# Patient Record
Sex: FEMALE | Race: WHITE | NOT HISPANIC OR LATINO | Employment: OTHER | URBAN - METROPOLITAN AREA
[De-identification: names, ages, dates, MRNs, and addresses within clinical notes are randomized per-mention and may not be internally consistent; named-entity substitution may affect disease eponyms.]

---

## 2020-01-25 ENCOUNTER — HOSPITAL ENCOUNTER (EMERGENCY)
Facility: HOSPITAL | Age: 70
Discharge: HOME | End: 2020-01-25
Attending: INTERNAL MEDICINE
Payer: MEDICARE

## 2020-01-25 VITALS
WEIGHT: 174.69 LBS | DIASTOLIC BLOOD PRESSURE: 77 MMHG | OXYGEN SATURATION: 98 % | BODY MASS INDEX: 28.08 KG/M2 | RESPIRATION RATE: 16 BRPM | TEMPERATURE: 98 F | SYSTOLIC BLOOD PRESSURE: 176 MMHG | HEIGHT: 66 IN | HEART RATE: 61 BPM
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
## 2020-01-25 DIAGNOSIS — S00.03XA CONTUSION OF SCALP, INITIAL ENCOUNTER: ICD-10-CM

## 2020-01-25 DIAGNOSIS — I10 ESSENTIAL HYPERTENSION: ICD-10-CM

## 2020-01-25 PROCEDURE — 99282 EMERGENCY DEPT VISIT SF MDM: CPT

## 2020-01-25 NOTE — DISCHARGE INSTRUCTIONS
Check blood pressure at home and report to primary care physician if his stays high.  Come back to the emergency room if you have any worsening headache lightheadedness loss of vision  Take fall precautions

## 2020-01-25 NOTE — ED PROVIDER NOTES
"SCRIBE #1 NOTE: I, Sarah Beth Owen, am scribing for, and in the presence of, Nakia Soto MD. I have scribed the entire note.       History     Chief Complaint   Patient presents with    Fall     pt c/o trip and fall this morning, pt reports striking head/face on ground, denies LOC, no blood thinners; pt c/o bump to R forehead and HA     Review of patient's allergies indicates:   Allergen Reactions    Keflex [cephalexin] Anaphylaxis and Swelling     "Throat closing"    Atenolol Other (See Comments)     "Cough and wheeze"    Augmentin [amoxicillin-pot clavulanate] Hives and Itching     "Itchy hives after 4 days"    Beta-blockers (beta-adrenergic blocking agts)     Betadine [povidone-iodine] Other (See Comments)     "Contact dermatitis"    Carbacephem     Cephalosporins     Clindamycin Other (See Comments)     "Severe heartburn"    Demerol [meperidine] Itching and Other (See Comments)     "Erythema and itching at injection site"    Guaifenesin Other (See Comments)     "Stomach cramps"    Hyoscamine Diarrhea    Levsin [hyoscyamine sulfate]     Lidocaine Other (See Comments)     "Rapid heart beat"    Macrodantin [nitrofurantoin macrocrystal]     Penicillins     Sulfa (sulfonamide antibiotics)     Thimerosal Other (See Comments)     "Positive patch test by history"    Urimar-t [lkwhto-lttbb-h.blue-sal-naphos]     Volmax [albuterol sulfate] Other (See Comments)     "Headache and shaky"    Ciprofloxacin Itching and Rash    Influenza virus vaccines Palpitations     "Fast heart rate"    Limbitrol [amitriptyline-chlordiazepoxide] Rash    Vibramycin [doxycycline hyclate] Rash    Zithromax [azithromycin] Itching and Rash         History of Present Illness     HPI    1/25/2020, 9:15 AM  History obtained from the patient      History of Present Illness: Christine Panda is a 69 y.o. female patient who presents to the Emergency Department for evaluation of fall which onset suddenly at 7:30 AM today. " Pt reports she tripped and fell this morning. She states her head/face struck the ground. Pt c/o abrasion to mid forehead in between eyebrows, nosebleed (resolved), and frontal HA. Symptoms are constant and moderate in severity. No mitigating or exacerbating factors reported. Patient denies any LOC, fever, chills, numbness, weakness, back pain, neck pain, abd pain, CP, SOB, n/v/d, and all other sxs at this time. No further complaints or concerns at this time.     Arrival mode: Personal vehicle      PCP: Marcelo Ferreira     Past Medical History:  Past medical history reviewed not relevant      Past Surgical History:  Past surgical history reviewed not relevant      Family History:  Family history reviewed not relevant      Social History:  Social History    Social History Main Topics    Social History Main Topics    Smoking status: Unknown if ever smoked    Smokeless tobacco: Unknown if ever used    Alcohol Use: Unknown drinking history    Drug Use: Unknown if ever used    Sexual Activity: Unknown        Review of Systems     Review of Systems   Constitutional: Negative for chills and fever.        (+) fall   (+) head injury/trauma   HENT: Positive for nosebleeds (resolved). Negative for sore throat.         (+) abrasion to mid forehead in between eyebrows   Respiratory: Negative for shortness of breath.    Cardiovascular: Negative for chest pain.   Gastrointestinal: Negative for abdominal pain, diarrhea, nausea and vomiting.   Genitourinary: Negative for dysuria.   Musculoskeletal: Negative for back pain and neck pain.   Skin: Negative for rash.   Neurological: Positive for headaches (frontal). Negative for weakness and numbness.        (-) LOC   Hematological: Does not bruise/bleed easily.   All other systems reviewed and are negative.     Physical Exam     Initial Vitals [01/25/20 0847]   BP Pulse Resp Temp SpO2   (!) 204/92 60 20 97.4 °F (36.3 °C) 100 %      MAP       --          Physical Exam  Nursing Notes  "and Vital Signs Reviewed.  Constitutional: Patient is in no acute distress. Well-developed and well-nourished.  Head: Abrasion to mid forehead in between eyebrows.  Normocephalic.  Eyes: PERRL. EOM intact. Conjunctivae are not pale. No scleral icterus.  Ears: Right TM normal. Left TM normal. No erythema. No bulging. No effusion or air-fluid levels. No perforation. No CSF leak.   Nose: Patent nares. Turbinates are normal. No drainage.   Throat: Moist mucous membranes. Posterior oropharynx is symmetric without erythema. Tonsillar exudate is not present. No trismus. Normal handling of secretions. No stridor.  Neck: Supple. Full ROM. No lymphadenopathy.  Cardiovascular: Regular rate. Regular rhythm. No murmurs, rubs, or gallops. Distal pulses are 2+ and symmetric.  Pulmonary/Chest: No respiratory distress. Clear to auscultation bilaterally. No wheezing or rales.  Abdominal: Soft and non-distended.  There is no tenderness.  No rebound, guarding, or rigidity. Good bowel sounds.  Genitourinary: No CVA tenderness  Musculoskeletal: Moves all extremities. No obvious deformities. No edema. No calf tenderness.  Skin: Warm and dry.  Neurological: Patient is alert and oriented to person, place and time. Pupils ERRL and EOM normal. Cranial nerves II-XII are intact. Strength is full bilaterally; it is equal and 5/5 in bilateral upper and lower extremities. There is no pronator drift of outstretched arms. Light touch sense is intact. Speech is clear and normal. No acute focal neurological deficits noted.  Psychiatric: Normal affect. Good eye contact. Appropriate in content.     ED Course   Procedures  ED Vital Signs:  Vitals:    01/25/20 0847 01/25/20 0918 01/25/20 0950 01/25/20 1000   BP: (!) 204/92 (!) 204/87 (!) 176/77    Pulse: 60   61   Resp: 20   16   Temp: 97.4 °F (36.3 °C)   97.7 °F (36.5 °C)   TempSrc: Oral   Oral   SpO2: 100%   98%   Weight: 79.2 kg (174 lb 11.4 oz)      Height: 5' 6" (1.676 m)          Abnormal Lab " Results:  Labs Reviewed - No data to display     Imaging Results:  Imaging Results    None                 The Emergency Provider reviewed the vital signs and test results, which are outlined above.     ED Discussion     10:16 AM: Reassessed pt at this time.  Pt's re-check BP was 176/77. It has come down 40-50 points at this time and I believe it will go down further. Discussed with pt all pertinent ED information and results. Discussed pt dx and plan of tx. Gave pt all f/u and return to the ED instructions. All questions and concerns were addressed at this time. Pt expresses understanding of information and instructions, and is comfortable with plan to discharge. Pt is stable for discharge.    I discussed with patient and/or family/caretaker that evaluation in the ED does not suggest any emergent or life threatening medical conditions requiring immediate intervention beyond what was provided in the ED, and I believe patient is safe for discharge.  Regardless, an unremarkable evaluation in the ED does not preclude the development or presence of a serious of life threatening condition. As such, patient was instructed to return immediately for any worsening or change in current symptoms.                   ED Medication(s):  Medications - No data to display    There are no discharge medications for this patient.      Follow-up Information     Go to  [facility].    Specialty:  Emergency Medicine  Why:  If symptoms worsen  Contact information:  [address] [facility] [address] [phone]                     Scribe Attestation:   Scribe #1: I performed the above scribed service and the documentation accurately describes the services I performed. I attest to the accuracy of the note.     Attending:   Physician Attestation Statement for Scribe #1: I, Nakia Soto MD, personally performed the services described in this documentation, as scribed by Sarah Beth Owen, in my  presence, and it is both accurate and complete.           Clinical Impression       ICD-10-CM ICD-9-CM   1. Fall, initial encounter W19.XXXA E888.9   2. Contusion of scalp, initial encounter S00.03XA 920   3. Essential hypertension I10 401.9     Patient not on any blood thinner.  No neurological deficit. Blood pressure much improved after observation of-hour in the emergency room.  Patient able to walk without any difficulty.  No neurovascular abnormality detected.  Thorough examination multiple visits carried out and no further workup our imaging was needed/necessary.  Patient is stable for discharge.      Disposition:   Disposition: Discharged  Condition: Stable       Nakia Soto MD  01/25/20 2883

## 2022-10-18 ENCOUNTER — HOSPITAL ENCOUNTER (EMERGENCY)
Facility: HOSPITAL | Age: 72
Discharge: HOME | End: 2022-10-18
Attending: EMERGENCY MEDICINE
Payer: MEDICARE

## 2022-10-18 VITALS
DIASTOLIC BLOOD PRESSURE: 74 MMHG | TEMPERATURE: 98 F | BODY MASS INDEX: 27.03 KG/M2 | HEART RATE: 54 BPM | OXYGEN SATURATION: 98 % | RESPIRATION RATE: 22 BRPM | WEIGHT: 167.44 LBS | SYSTOLIC BLOOD PRESSURE: 145 MMHG

## 2022-10-18 DIAGNOSIS — R42 LIGHTHEADEDNESS: ICD-10-CM

## 2022-10-18 DIAGNOSIS — I48.20 CHRONIC ATRIAL FIBRILLATION: ICD-10-CM

## 2022-10-18 DIAGNOSIS — R53.1 WEAKNESS: Primary | ICD-10-CM

## 2022-10-18 LAB
ALBUMIN SERPL BCP-MCNC: 4.3 G/DL (ref 3.5–5.2)
ALP SERPL-CCNC: 106 U/L (ref 55–135)
ALT SERPL W/O P-5'-P-CCNC: 22 U/L (ref 10–44)
ANION GAP SERPL CALC-SCNC: 12 MMOL/L (ref 8–16)
AST SERPL-CCNC: 23 U/L (ref 10–40)
BACTERIA #/AREA URNS HPF: NORMAL /HPF
BASOPHILS # BLD AUTO: 0.04 K/UL (ref 0–0.2)
BASOPHILS NFR BLD: 0.7 % (ref 0–1.9)
BILIRUB SERPL-MCNC: 1.2 MG/DL (ref 0.1–1)
BILIRUB UR QL STRIP: NEGATIVE
BNP SERPL-MCNC: 176 PG/ML (ref 0–99)
BUN SERPL-MCNC: 12 MG/DL (ref 8–23)
CALCIUM SERPL-MCNC: 10.6 MG/DL (ref 8.7–10.5)
CHLORIDE SERPL-SCNC: 108 MMOL/L (ref 95–110)
CLARITY UR: CLEAR
CO2 SERPL-SCNC: 23 MMOL/L (ref 23–29)
COLOR UR: COLORLESS
CREAT SERPL-MCNC: 0.8 MG/DL (ref 0.5–1.4)
DIFFERENTIAL METHOD: NORMAL
EOSINOPHIL # BLD AUTO: 0.1 K/UL (ref 0–0.5)
EOSINOPHIL NFR BLD: 2 % (ref 0–8)
ERYTHROCYTE [DISTWIDTH] IN BLOOD BY AUTOMATED COUNT: 13.2 % (ref 11.5–14.5)
EST. GFR  (NO RACE VARIABLE): >60 ML/MIN/1.73 M^2
GLUCOSE SERPL-MCNC: 95 MG/DL (ref 70–110)
GLUCOSE UR QL STRIP: NEGATIVE
HCT VFR BLD AUTO: 45 % (ref 37–48.5)
HCV AB SERPL QL IA: NEGATIVE
HEP C VIRUS HOLD SPECIMEN: NORMAL
HGB BLD-MCNC: 15 G/DL (ref 12–16)
HGB UR QL STRIP: NEGATIVE
HIV 1+2 AB+HIV1 P24 AG SERPL QL IA: NEGATIVE
IMM GRANULOCYTES # BLD AUTO: 0.03 K/UL (ref 0–0.04)
IMM GRANULOCYTES NFR BLD AUTO: 0.5 % (ref 0–0.5)
KETONES UR QL STRIP: NEGATIVE
LEUKOCYTE ESTERASE UR QL STRIP: ABNORMAL
LYMPHOCYTES # BLD AUTO: 2.4 K/UL (ref 1–4.8)
LYMPHOCYTES NFR BLD: 40.1 % (ref 18–48)
MCH RBC QN AUTO: 28.6 PG (ref 27–31)
MCHC RBC AUTO-ENTMCNC: 33.3 G/DL (ref 32–36)
MCV RBC AUTO: 86 FL (ref 82–98)
MICROSCOPIC COMMENT: NORMAL
MONOCYTES # BLD AUTO: 0.7 K/UL (ref 0.3–1)
MONOCYTES NFR BLD: 11.2 % (ref 4–15)
NEUTROPHILS # BLD AUTO: 2.8 K/UL (ref 1.8–7.7)
NEUTROPHILS NFR BLD: 45.5 % (ref 38–73)
NITRITE UR QL STRIP: NEGATIVE
NRBC BLD-RTO: 0 /100 WBC
PH UR STRIP: 8 [PH] (ref 5–8)
PLATELET # BLD AUTO: 252 K/UL (ref 150–450)
PMV BLD AUTO: 11.4 FL (ref 9.2–12.9)
POTASSIUM SERPL-SCNC: 4.1 MMOL/L (ref 3.5–5.1)
PROT SERPL-MCNC: 8.2 G/DL (ref 6–8.4)
PROT UR QL STRIP: NEGATIVE
RBC # BLD AUTO: 5.25 M/UL (ref 4–5.4)
SODIUM SERPL-SCNC: 143 MMOL/L (ref 136–145)
SP GR UR STRIP: <1.005 (ref 1–1.03)
SQUAMOUS #/AREA URNS HPF: 5 /HPF
TROPONIN I SERPL DL<=0.01 NG/ML-MCNC: <0.006 NG/ML (ref 0–0.03)
TROPONIN I SERPL DL<=0.01 NG/ML-MCNC: <0.006 NG/ML (ref 0–0.03)
URN SPEC COLLECT METH UR: ABNORMAL
UROBILINOGEN UR STRIP-ACNC: NEGATIVE EU/DL
WBC # BLD AUTO: 6.08 K/UL (ref 3.9–12.7)
WBC #/AREA URNS HPF: 3 /HPF (ref 0–5)

## 2022-10-18 PROCEDURE — 80053 COMPREHEN METABOLIC PANEL: CPT | Performed by: PHYSICIAN ASSISTANT

## 2022-10-18 PROCEDURE — 84484 ASSAY OF TROPONIN QUANT: CPT | Performed by: PHYSICIAN ASSISTANT

## 2022-10-18 PROCEDURE — 25000003 PHARM REV CODE 250: Performed by: EMERGENCY MEDICINE

## 2022-10-18 PROCEDURE — 93005 ELECTROCARDIOGRAM TRACING: CPT

## 2022-10-18 PROCEDURE — 86803 HEPATITIS C AB TEST: CPT | Performed by: EMERGENCY MEDICINE

## 2022-10-18 PROCEDURE — 83880 ASSAY OF NATRIURETIC PEPTIDE: CPT | Performed by: PHYSICIAN ASSISTANT

## 2022-10-18 PROCEDURE — 96374 THER/PROPH/DIAG INJ IV PUSH: CPT

## 2022-10-18 PROCEDURE — 93010 ELECTROCARDIOGRAM REPORT: CPT | Mod: ,,, | Performed by: INTERNAL MEDICINE

## 2022-10-18 PROCEDURE — 87389 HIV-1 AG W/HIV-1&-2 AB AG IA: CPT | Performed by: EMERGENCY MEDICINE

## 2022-10-18 PROCEDURE — 99285 EMERGENCY DEPT VISIT HI MDM: CPT | Mod: 25

## 2022-10-18 PROCEDURE — 81000 URINALYSIS NONAUTO W/SCOPE: CPT | Performed by: EMERGENCY MEDICINE

## 2022-10-18 PROCEDURE — 93010 EKG 12-LEAD: ICD-10-PCS | Mod: ,,, | Performed by: INTERNAL MEDICINE

## 2022-10-18 PROCEDURE — 85025 COMPLETE CBC W/AUTO DIFF WBC: CPT | Performed by: PHYSICIAN ASSISTANT

## 2022-10-18 RX ORDER — METOPROLOL TARTRATE 1 MG/ML
5 INJECTION, SOLUTION INTRAVENOUS
Status: COMPLETED | OUTPATIENT
Start: 2022-10-18 | End: 2022-10-18

## 2022-10-18 RX ADMIN — METOROPROLOL TARTRATE 5 MG: 5 INJECTION, SOLUTION INTRAVENOUS at 01:10

## 2022-10-18 NOTE — FIRST PROVIDER EVALUATION
Medical screening examination initiated.  I have conducted a focused provider triage encounter, findings are as follows:    Brief history of present illness:  Feeling near syncopal for last 2 hours. Hx afib    There were no vitals filed for this visit.    Pertinent physical exam:  alert        Preliminary workup initiated; this workup will be continued and followed by the physician or advanced practice provider that is assigned to the patient when roomed.

## 2022-10-18 NOTE — ED PROVIDER NOTES
"SCRIBE #1 NOTE: I, Agnieszka Rodriguez, am scribing for, and in the presence of, Merle Kothari MD. I have scribed the entire note.      History      Chief Complaint   Patient presents with    Weakness     Generalized weakness x2 hours, states she feels like she is about to pass out. Hx afib. Denies chest pain/SOB       Review of patient's allergies indicates:   Allergen Reactions    Keflex [cephalexin] Anaphylaxis and Swelling     "Throat closing"    Atenolol Other (See Comments)     "Cough and wheeze"    Augmentin [amoxicillin-pot clavulanate] Hives and Itching     "Itchy hives after 4 days"    Beta-blockers (beta-adrenergic blocking agts)     Betadine [povidone-iodine] Other (See Comments)     "Contact dermatitis"    Carbacephem     Cephalosporins     Chlorpheniram-pe-chlophedianol     Clindamycin Other (See Comments)     "Severe heartburn"    Demerol [meperidine] Itching and Other (See Comments)     "Erythema and itching at injection site"    Guaifenesin Other (See Comments)     "Stomach cramps"    Hyoscamine Diarrhea    Levsin [hyoscyamine sulfate]     Lidocaine Other (See Comments)     "Rapid heart beat"    Macrodantin [nitrofurantoin macrocrystal]     Penicillins     Sulfa (sulfonamide antibiotics)     Thimerosal Other (See Comments)     "Positive patch test by history"    Urimar-t [tcxijp-yvmyf-u.blue-sal-naphos]     Vancomycin     Volmax [albuterol sulfate] Other (See Comments)     "Headache and shaky"    Ciprofloxacin Itching and Rash    Influenza virus vaccines Palpitations     "Fast heart rate"    Limbitrol [amitriptyline-chlordiazepoxide] Rash    Vibramycin [doxycycline hyclate] Rash    Zithromax [azithromycin] Itching and Rash        HPI   HPI    10/18/2022, 1:23 PM   History obtained from the patient      History of Present Illness: Christine Panda is a 72 y.o. female patient with a PMHx of asthma and atrial fibrillation who presents to the Emergency Department for light-headedness which onset " suddenly 2 hours PTA. The pt reports that she regularly gets episodes of light-headedness, but she usually only gets 1 or 2 episodes and her sxs do not normally last as long as they are lasting today. Pt reports that she feels better now. Symptoms are episodic and moderate in severity. No mitigating or exacerbating factors reported. Associated sxs include generalized weakness and near-syncope. Patient denies any CP, SOB, palpitations, dizziness, fever, chills, numbness, and all other sxs at this time. No prior Tx reported. Pt states that she takes 5 mg Eliquis BID and 25 mg Toprol BID for A-fib and took them both today. Pt reports that her Cardiologist is Dr. Moeller. No further complaints or concerns at this time.         Arrival mode: Personal vehicle     PCP: Marcelo Ferreira       Past Medical History:  Past Medical History:   Diagnosis Date    Asthma     Atrial fibrillation        Past Surgical History:  Past Surgical History:   Procedure Laterality Date    APPENDECTOMY      BILATERAL SALPINGO-OOPHORECTOMY (BSO)      BLADDER SURGERY      ELBOW SURGERY      LAVH       TONSILLECTOMY           Family History:  Family History   Problem Relation Age of Onset    Colon cancer Mother     Breast cancer Maternal Aunt     Uterine cancer Maternal Aunt     Diabetes type II Maternal Aunt     Hypertension Maternal Grandmother     Hypertension Maternal Grandfather     Hypertension Paternal Grandmother     Hypertension Paternal Grandfather        Social History:  Social History     Tobacco Use    Smoking status: Never    Smokeless tobacco: Never   Substance and Sexual Activity    Alcohol use: Never    Drug use: Never    Sexual activity: Not Currently     Partners: Male       ROS   Review of Systems   Constitutional:  Negative for chills and fever.   HENT:  Negative for sore throat.    Respiratory:  Negative for shortness of breath.    Cardiovascular:  Negative for chest pain and palpitations.   Gastrointestinal:  Negative for  nausea.   Genitourinary:  Negative for dysuria.   Musculoskeletal:  Negative for back pain.   Skin:  Negative for rash.   Neurological:  Positive for weakness (generalized) and light-headedness. Negative for dizziness and numbness.        (+) near-syncope   Hematological:  Does not bruise/bleed easily.   All other systems reviewed and are negative.    Physical Exam      Initial Vitals   BP Pulse Resp Temp SpO2   10/18/22 1126 10/18/22 1125 10/18/22 1125 10/18/22 1125 10/18/22 1125   (!) 175/105 100 19 97.5 °F (36.4 °C) 99 %      MAP       --                 Physical Exam  Nursing Notes and Vital Signs Reviewed.  Constitutional: Patient is in no acute distress. Well-developed and well-nourished.  Head: Atraumatic. Normocephalic.  Eyes: PERRL. EOM intact. Conjunctivae are not pale. No scleral icterus.  ENT: Mucous membranes are moist. Oropharynx is clear and symmetric.    Neck: Supple. Full ROM. No lymphadenopathy.  Cardiovascular: Regular rate. Regular rhythm. No murmurs, rubs, or gallops. Distal pulses are 2+ and symmetric.  Pulmonary/Chest: No respiratory distress. Clear to auscultation bilaterally. No wheezing or rales.  Abdominal: Soft and non-distended.  There is no tenderness.  No rebound, guarding, or rigidity.  Musculoskeletal: Moves all extremities. No obvious deformities. No edema.  Skin: Warm and dry.  Neurological:  Alert, awake, and appropriate.  Normal speech.  No acute focal neurological deficits are appreciated.  Psychiatric: Normal affect. Good eye contact. Appropriate in content.    ED Course    Procedures  ED Vital Signs:  Vitals:    10/18/22 1125 10/18/22 1126 10/18/22 1127 10/18/22 1306   BP:  (!) 175/105     Pulse: 100   99   Resp: 19      Temp: 97.5 °F (36.4 °C)      TempSrc: Oral      SpO2: 99%      Weight:   76 kg (167 lb 7 oz)     10/18/22 1330 10/18/22 1336 10/18/22 1415 10/18/22 1500   BP: (!) 173/113 (!) 173/113  (!) 145/74   Pulse: 83  (!) 54 (!) 54   Resp: 18  (!) 33 (!) 22   Temp:        TempSrc:       SpO2: 100%  96% 98%   Weight:           Abnormal Lab Results:  Labs Reviewed   COMPREHENSIVE METABOLIC PANEL - Abnormal; Notable for the following components:       Result Value    Calcium 10.6 (*)     Total Bilirubin 1.2 (*)     All other components within normal limits   B-TYPE NATRIURETIC PEPTIDE - Abnormal; Notable for the following components:     (*)     All other components within normal limits   URINALYSIS, REFLEX TO URINE CULTURE - Abnormal; Notable for the following components:    Color, UA Colorless (*)     Specific Gravity, UA <1.005 (*)     Leukocytes, UA 1+ (*)     All other components within normal limits    Narrative:     Specimen Source->Urine   CBC W/ AUTO DIFFERENTIAL   TROPONIN I   TROPONIN I   HIV 1 / 2 ANTIBODY    Narrative:     Release to patient->Immediate   HEPATITIS C ANTIBODY    Narrative:     Release to patient->Immediate   HEP C VIRUS HOLD SPECIMEN    Narrative:     Release to patient->Immediate   URINALYSIS MICROSCOPIC    Narrative:     Specimen Source->Urine        All Lab Results:  Results for orders placed or performed during the hospital encounter of 10/18/22   CBC auto differential   Result Value Ref Range    WBC 6.08 3.90 - 12.70 K/uL    RBC 5.25 4.00 - 5.40 M/uL    Hemoglobin 15.0 12.0 - 16.0 g/dL    Hematocrit 45.0 37.0 - 48.5 %    MCV 86 82 - 98 fL    MCH 28.6 27.0 - 31.0 pg    MCHC 33.3 32.0 - 36.0 g/dL    RDW 13.2 11.5 - 14.5 %    Platelets 252 150 - 450 K/uL    MPV 11.4 9.2 - 12.9 fL    Immature Granulocytes 0.5 0.0 - 0.5 %    Gran # (ANC) 2.8 1.8 - 7.7 K/uL    Immature Grans (Abs) 0.03 0.00 - 0.04 K/uL    Lymph # 2.4 1.0 - 4.8 K/uL    Mono # 0.7 0.3 - 1.0 K/uL    Eos # 0.1 0.0 - 0.5 K/uL    Baso # 0.04 0.00 - 0.20 K/uL    nRBC 0 0 /100 WBC    Gran % 45.5 38.0 - 73.0 %    Lymph % 40.1 18.0 - 48.0 %    Mono % 11.2 4.0 - 15.0 %    Eosinophil % 2.0 0.0 - 8.0 %    Basophil % 0.7 0.0 - 1.9 %    Differential Method Automated    Comprehensive metabolic panel    Result Value Ref Range    Sodium 143 136 - 145 mmol/L    Potassium 4.1 3.5 - 5.1 mmol/L    Chloride 108 95 - 110 mmol/L    CO2 23 23 - 29 mmol/L    Glucose 95 70 - 110 mg/dL    BUN 12 8 - 23 mg/dL    Creatinine 0.8 0.5 - 1.4 mg/dL    Calcium 10.6 (H) 8.7 - 10.5 mg/dL    Total Protein 8.2 6.0 - 8.4 g/dL    Albumin 4.3 3.5 - 5.2 g/dL    Total Bilirubin 1.2 (H) 0.1 - 1.0 mg/dL    Alkaline Phosphatase 106 55 - 135 U/L    AST 23 10 - 40 U/L    ALT 22 10 - 44 U/L    Anion Gap 12 8 - 16 mmol/L    eGFR >60 >60 mL/min/1.73 m^2   Troponin I #1   Result Value Ref Range    Troponin I <0.006 0.000 - 0.026 ng/mL   Troponin I #2   Result Value Ref Range    Troponin I <0.006 0.000 - 0.026 ng/mL   BNP   Result Value Ref Range     (H) 0 - 99 pg/mL   HIV 1/2 Ag/Ab (4th Gen)   Result Value Ref Range    HIV 1/2 Ag/Ab Negative Negative   Hepatitis C Antibody   Result Value Ref Range    Hepatitis C Ab Negative Negative   HCV Virus Hold Specimen   Result Value Ref Range    HEP C Virus Hold Specimen Hold for HCV sendout    Urinalysis, Reflex to Urine Culture Urine, Clean Catch    Specimen: Urine   Result Value Ref Range    Specimen UA Urine, Clean Catch     Color, UA Colorless (A) Yellow, Straw, Lesli    Appearance, UA Clear Clear    pH, UA 8.0 5.0 - 8.0    Specific Gravity, UA <1.005 (A) 1.005 - 1.030    Protein, UA Negative Negative    Glucose, UA Negative Negative    Ketones, UA Negative Negative    Bilirubin (UA) Negative Negative    Occult Blood UA Negative Negative    Nitrite, UA Negative Negative    Urobilinogen, UA Negative <2.0 EU/dL    Leukocytes, UA 1+ (A) Negative   Urinalysis Microscopic   Result Value Ref Range    WBC, UA 3 0 - 5 /hpf    Bacteria Rare None-Occ /hpf    Squam Epithel, UA 5 /hpf    Microscopic Comment SEE COMMENT            Imaging Results:  Imaging Results              X-Ray Chest AP Portable (Final result)  Result time 10/18/22 14:39:18      Final result by Galo Adkins MD (10/18/22 14:39:18)                    Impression:      No acute abnormality.      Electronically signed by: Galo Adkins  Date:    10/18/2022  Time:    14:39               Narrative:    EXAMINATION:  XR CHEST AP PORTABLE    CLINICAL HISTORY:  Weakness    TECHNIQUE:  Single frontal view of the chest was performed.    COMPARISON:  None    FINDINGS:  The lungs are clear, with normal appearance of pulmonary vasculature and no pleural effusion or pneumothorax.    The cardiac silhouette is normal in size. The hilar and mediastinal contours are unremarkable.    Bones are intact.                                     The EKG was ordered, reviewed, and independently interpreted by the ED provider.  Interpretation time: 11:24  Rate: 101 BPM  Rhythm: atrial flutter with variable A-V block  Interpretation: Nonspecific ST abnormality. No STEMI.           The Emergency Provider reviewed the vital signs and test results, which are outlined above.    ED Discussion     3:10 PM: Reassessed pt at this time. Discussed with pt all pertinent ED information and results. Discussed pt dx and plan of tx. Gave pt all f/u and return to the ED instructions. All questions and concerns were addressed at this time. Pt expresses understanding of information and instructions, and is comfortable with plan to discharge. Pt is stable for discharge.    I discussed with patient and/or family/caretaker that evaluation in the ED does not suggest any emergent or life threatening medical conditions requiring immediate intervention beyond what was provided in the ED, and I believe patient is safe for discharge.  Regardless, an unremarkable evaluation in the ED does not preclude the development or presence of a serious of life threatening condition. As such, patient was instructed to return immediately for any worsening or change in current symptoms.             ED Medication(s):  Medications   metoprolol injection 5 mg (5 mg Intravenous Given 10/18/22 1336)       Discharge Medication  List as of 10/18/2022  3:15 PM           Follow-up Information       Morales Moeller MD. Schedule an appointment as soon as possible for a visit in 2 days.    Specialty: Cardiology  Why: Return to the Emergency Room, If symptoms worsen  Contact information:  [address] [facility]  [address]  [facility] [address]  [phone]                               Medical Decision Making    Medical Decision Making:   Clinical Tests:   Lab Tests: Ordered and Reviewed  Radiological Study: Ordered and Reviewed  Medical Tests: Ordered and Reviewed         Scribe Attestation:   Scribe #1: I performed the above scribed service and the documentation accurately describes the services I performed. I attest to the accuracy of the note.    Attending:   Physician Attestation Statement for Scribe #1: I, Merle Kothari MD, personally performed the services described in this documentation, as scribed by Agnieszka Rodriguez, in my presence, and it is both accurate and complete.          Clinical Impression       ICD-10-CM ICD-9-CM   1. Weakness  R53.1 780.79   2. Lightheadedness  R42 780.4   3. Chronic atrial fibrillation  I48.20 427.31       Disposition:   Disposition: Discharged  Condition: Stable       Merle Kothari MD  10/18/22 7600

## 2022-11-11 ENCOUNTER — HOSPITAL ENCOUNTER (OUTPATIENT)
Facility: HOSPITAL | Age: 72
Discharge: HOME | End: 2022-11-12
Attending: EMERGENCY MEDICINE | Admitting: STUDENT IN AN ORGANIZED HEALTH CARE EDUCATION/TRAINING PROGRAM
Payer: MEDICARE

## 2022-11-11 DIAGNOSIS — R55 NEAR SYNCOPE: ICD-10-CM

## 2022-11-11 DIAGNOSIS — I48.0 PAF (PAROXYSMAL ATRIAL FIBRILLATION): ICD-10-CM

## 2022-11-11 DIAGNOSIS — I48.91 ATRIAL FIBRILLATION: Primary | ICD-10-CM

## 2022-11-11 DIAGNOSIS — I10 CHRONIC HYPERTENSION: ICD-10-CM

## 2022-11-11 DIAGNOSIS — F41.9 ANXIETY: ICD-10-CM

## 2022-11-11 DIAGNOSIS — R07.9 CHEST PAIN: ICD-10-CM

## 2022-11-11 DIAGNOSIS — Z79.01 CHRONIC ANTICOAGULATION: ICD-10-CM

## 2022-11-11 LAB
ALBUMIN SERPL BCP-MCNC: 4.2 G/DL (ref 3.5–5.2)
ALP SERPL-CCNC: 103 U/L (ref 55–135)
ALT SERPL W/O P-5'-P-CCNC: 17 U/L (ref 10–44)
ANION GAP SERPL CALC-SCNC: 14 MMOL/L (ref 8–16)
APTT BLDCRRT: 34.7 SEC (ref 21–32)
AST SERPL-CCNC: 17 U/L (ref 10–40)
BASOPHILS # BLD AUTO: 0.03 K/UL (ref 0–0.2)
BASOPHILS NFR BLD: 0.5 % (ref 0–1.9)
BILIRUB SERPL-MCNC: 0.9 MG/DL (ref 0.1–1)
BUN SERPL-MCNC: 10 MG/DL (ref 8–23)
CALCIUM SERPL-MCNC: 10.3 MG/DL (ref 8.7–10.5)
CHLORIDE SERPL-SCNC: 107 MMOL/L (ref 95–110)
CK SERPL-CCNC: 52 U/L (ref 20–180)
CO2 SERPL-SCNC: 20 MMOL/L (ref 23–29)
CREAT SERPL-MCNC: 0.8 MG/DL (ref 0.5–1.4)
DIFFERENTIAL METHOD: ABNORMAL
EOSINOPHIL # BLD AUTO: 0.1 K/UL (ref 0–0.5)
EOSINOPHIL NFR BLD: 1.7 % (ref 0–8)
ERYTHROCYTE [DISTWIDTH] IN BLOOD BY AUTOMATED COUNT: 13.2 % (ref 11.5–14.5)
EST. GFR  (NO RACE VARIABLE): >60 ML/MIN/1.73 M^2
GLUCOSE SERPL-MCNC: 97 MG/DL (ref 70–110)
HCT VFR BLD AUTO: 46.9 % (ref 37–48.5)
HGB BLD-MCNC: 15.5 G/DL (ref 12–16)
IMM GRANULOCYTES # BLD AUTO: 0 K/UL (ref 0–0.04)
IMM GRANULOCYTES NFR BLD AUTO: 0 % (ref 0–0.5)
INR PPP: 1.1 (ref 0.8–1.2)
LYMPHOCYTES # BLD AUTO: 3.1 K/UL (ref 1–4.8)
LYMPHOCYTES NFR BLD: 48.5 % (ref 18–48)
MAGNESIUM SERPL-MCNC: 2 MG/DL (ref 1.6–2.6)
MCH RBC QN AUTO: 28.9 PG (ref 27–31)
MCHC RBC AUTO-ENTMCNC: 33 G/DL (ref 32–36)
MCV RBC AUTO: 87 FL (ref 82–98)
MONOCYTES # BLD AUTO: 0.8 K/UL (ref 0.3–1)
MONOCYTES NFR BLD: 11.6 % (ref 4–15)
NEUTROPHILS # BLD AUTO: 2.4 K/UL (ref 1.8–7.7)
NEUTROPHILS NFR BLD: 37.7 % (ref 38–73)
NRBC BLD-RTO: 0 /100 WBC
PLATELET # BLD AUTO: 224 K/UL (ref 150–450)
PMV BLD AUTO: 11.3 FL (ref 9.2–12.9)
POTASSIUM SERPL-SCNC: 3.9 MMOL/L (ref 3.5–5.1)
PROT SERPL-MCNC: 8.5 G/DL (ref 6–8.4)
PROTHROMBIN TIME: 11.5 SEC (ref 9–12.5)
RBC # BLD AUTO: 5.37 M/UL (ref 4–5.4)
SODIUM SERPL-SCNC: 141 MMOL/L (ref 136–145)
TROPONIN I SERPL DL<=0.01 NG/ML-MCNC: <0.006 NG/ML (ref 0–0.03)
TSH SERPL DL<=0.005 MIU/L-ACNC: 1.12 UIU/ML (ref 0.4–4)
WBC # BLD AUTO: 6.45 K/UL (ref 3.9–12.7)

## 2022-11-11 PROCEDURE — 25000003 PHARM REV CODE 250: Performed by: NURSE PRACTITIONER

## 2022-11-11 PROCEDURE — G0378 HOSPITAL OBSERVATION PER HR: HCPCS

## 2022-11-11 PROCEDURE — 84443 ASSAY THYROID STIM HORMONE: CPT | Performed by: EMERGENCY MEDICINE

## 2022-11-11 PROCEDURE — 25000003 PHARM REV CODE 250: Performed by: INTERNAL MEDICINE

## 2022-11-11 PROCEDURE — 85610 PROTHROMBIN TIME: CPT | Performed by: EMERGENCY MEDICINE

## 2022-11-11 PROCEDURE — 93010 EKG 12-LEAD: ICD-10-PCS | Mod: ,,, | Performed by: INTERNAL MEDICINE

## 2022-11-11 PROCEDURE — 99285 EMERGENCY DEPT VISIT HI MDM: CPT | Mod: 25

## 2022-11-11 PROCEDURE — 99219 PR INITIAL OBSERVATION CARE,LEVL II: CPT | Mod: ,,, | Performed by: INTERNAL MEDICINE

## 2022-11-11 PROCEDURE — 93005 ELECTROCARDIOGRAM TRACING: CPT

## 2022-11-11 PROCEDURE — 99219 PR INITIAL OBSERVATION CARE,LEVL II: ICD-10-PCS | Mod: ,,, | Performed by: INTERNAL MEDICINE

## 2022-11-11 PROCEDURE — 25000003 PHARM REV CODE 250: Performed by: EMERGENCY MEDICINE

## 2022-11-11 PROCEDURE — 80053 COMPREHEN METABOLIC PANEL: CPT | Performed by: NURSE PRACTITIONER

## 2022-11-11 PROCEDURE — 85730 THROMBOPLASTIN TIME PARTIAL: CPT | Performed by: EMERGENCY MEDICINE

## 2022-11-11 PROCEDURE — 82550 ASSAY OF CK (CPK): CPT | Performed by: NURSE PRACTITIONER

## 2022-11-11 PROCEDURE — 84484 ASSAY OF TROPONIN QUANT: CPT | Performed by: NURSE PRACTITIONER

## 2022-11-11 PROCEDURE — 83735 ASSAY OF MAGNESIUM: CPT | Performed by: EMERGENCY MEDICINE

## 2022-11-11 PROCEDURE — 85025 COMPLETE CBC W/AUTO DIFF WBC: CPT | Performed by: NURSE PRACTITIONER

## 2022-11-11 PROCEDURE — 93010 ELECTROCARDIOGRAM REPORT: CPT | Mod: ,,, | Performed by: INTERNAL MEDICINE

## 2022-11-11 RX ORDER — PROPAFENONE HYDROCHLORIDE 150 MG/1
150 TABLET, COATED ORAL EVERY 8 HOURS
Status: DISCONTINUED | OUTPATIENT
Start: 2022-11-11 | End: 2022-11-12 | Stop reason: HOSPADM

## 2022-11-11 RX ORDER — ACETAMINOPHEN 325 MG/1
650 TABLET ORAL EVERY 6 HOURS PRN
Status: DISCONTINUED | OUTPATIENT
Start: 2022-11-11 | End: 2022-11-12 | Stop reason: HOSPADM

## 2022-11-11 RX ORDER — HYDROXYZINE PAMOATE 25 MG/1
25 CAPSULE ORAL
Status: COMPLETED | OUTPATIENT
Start: 2022-11-11 | End: 2022-11-11

## 2022-11-11 RX ORDER — MAG HYDROX/ALUMINUM HYD/SIMETH 200-200-20
30 SUSPENSION, ORAL (FINAL DOSE FORM) ORAL 4 TIMES DAILY PRN
Status: DISCONTINUED | OUTPATIENT
Start: 2022-11-11 | End: 2022-11-12 | Stop reason: HOSPADM

## 2022-11-11 RX ORDER — MINERAL OIL
180 ENEMA (ML) RECTAL NIGHTLY
COMMUNITY

## 2022-11-11 RX ORDER — NALOXONE HCL 0.4 MG/ML
0.02 VIAL (ML) INJECTION
Status: DISCONTINUED | OUTPATIENT
Start: 2022-11-11 | End: 2022-11-12 | Stop reason: HOSPADM

## 2022-11-11 RX ORDER — MONTELUKAST SODIUM 10 MG/1
10 TABLET ORAL NIGHTLY
Status: DISCONTINUED | OUTPATIENT
Start: 2022-11-11 | End: 2022-11-12 | Stop reason: HOSPADM

## 2022-11-11 RX ORDER — SODIUM CHLORIDE 0.9 % (FLUSH) 0.9 %
10 SYRINGE (ML) INJECTION EVERY 12 HOURS PRN
Status: DISCONTINUED | OUTPATIENT
Start: 2022-11-11 | End: 2022-11-12 | Stop reason: HOSPADM

## 2022-11-11 RX ORDER — METOPROLOL SUCCINATE 25 MG/1
25 TABLET, EXTENDED RELEASE ORAL 2 TIMES DAILY
Status: DISCONTINUED | OUTPATIENT
Start: 2022-11-12 | End: 2022-11-12 | Stop reason: HOSPADM

## 2022-11-11 RX ORDER — ONDANSETRON 2 MG/ML
4 INJECTION INTRAMUSCULAR; INTRAVENOUS EVERY 8 HOURS PRN
Status: DISCONTINUED | OUTPATIENT
Start: 2022-11-11 | End: 2022-11-12 | Stop reason: HOSPADM

## 2022-11-11 RX ORDER — TALC
6 POWDER (GRAM) TOPICAL NIGHTLY PRN
Status: DISCONTINUED | OUTPATIENT
Start: 2022-11-11 | End: 2022-11-12 | Stop reason: HOSPADM

## 2022-11-11 RX ADMIN — HYDROXYZINE PAMOATE 25 MG: 25 CAPSULE ORAL at 05:11

## 2022-11-11 RX ADMIN — MONTELUKAST 10 MG: 10 TABLET, FILM COATED ORAL at 09:11

## 2022-11-11 RX ADMIN — APIXABAN 5 MG: 2.5 TABLET, FILM COATED ORAL at 09:11

## 2022-11-11 RX ADMIN — PROPAFENONE HYDROCHLORIDE 150 MG: 150 TABLET, FILM COATED ORAL at 06:11

## 2022-11-11 NOTE — HPI
71 yo female, cardiology consult for afib and faint  PMH AFIB  Dx of APF in  and f/u Dr. Moeller.   C/o faint and palpitation. Worsening recently  2 weeks ago, had nuke stress test and echo ordered by Dr. Moeller and negative. Had rash on face after started flecainide and stopped now  In ER ekg on 3 pm showed AFIB and converted to SR  Hgb 15 plt 200 Cr 0.8, troponin and BNP wnl  CXR no acute issue  Now on SR 65 bpm, denied chest pain dizziness and faint

## 2022-11-11 NOTE — SUBJECTIVE & OBJECTIVE
"Past Medical History:   Diagnosis Date    Asthma     Atrial fibrillation        Past Surgical History:   Procedure Laterality Date    APPENDECTOMY      BILATERAL SALPINGO-OOPHORECTOMY (BSO)      BLADDER SURGERY      ELBOW SURGERY      LAVH       TONSILLECTOMY         Review of patient's allergies indicates:   Allergen Reactions    Cephalexin Anaphylaxis and Swelling     "Throat closing"    Albuterol sulfate Other (See Comments)     "Headache and shaky"    Amlodipine Other (See Comments)    Amoxicillin-pot clavulanate Hives and Itching     "Itchy hives after 4 days"    Atenolol Other (See Comments)     "Cough and wheeze"    Beta-blockers (beta-adrenergic blocking agts)     Carbacephem     Cephalosporins     Chlorpheniram-pe-chlophedianol     Clindamycin Other (See Comments)     "Severe heartburn"    Flecainide Other (See Comments)    Guaifenesin Other (See Comments)     "Stomach cramps"    Hyoscamine Diarrhea    Levsin [hyoscyamine sulfate]     Macrodantin [nitrofurantoin macrocrystal]     Meperidine Itching and Other (See Comments)     "Erythema and itching at injection site"    Penicillins     Sulfa (sulfonamide antibiotics)     Thimerosal Other (See Comments)     "Positive patch test by history"    Urimar-t [tgclum-pyqyy-m.blue-sal-naphos]     Vancomycin     Amitriptyline-chlordiazepoxide Rash    Azithromycin Itching and Rash    Ciprofloxacin Itching and Rash    Influenza virus vaccines Palpitations     "Fast heart rate"    Lidocaine Other (See Comments) and Palpitations     "Rapid heart beat"    Povidone-iodine Other (See Comments) and Rash     "Contact dermatitis"    Vibramycin [doxycycline hyclate] Rash       No current facility-administered medications on file prior to encounter.     Current Outpatient Medications on File Prior to Encounter   Medication Sig    apixaban (ELIQUIS) 5 mg Tab Take 5 mg by mouth 2 (two) times daily.    fexofenadine (ALLEGRA) 180 MG tablet Take 180 mg by mouth every evening.    " metoprolol succinate (TOPROL-XL) 25 MG 24 hr tablet Take 25 mg by mouth 2 (two) times daily.    montelukast (SINGULAIR) 10 mg tablet Take 10 mg by mouth every evening.    [DISCONTINUED] calcium carbonate-vitamin D3 (CALCIUM 600 WITH VITAMIN D3) 600 mg(1,500mg) -400 unit Chew Caltrate 600 + D Take No date recorded No form recorded No frequency recorded No route recorded No set duration recorded No set duration amount recorded suspended No dosage strength recorded No dosage strength units of measure recorded    [DISCONTINUED] calcium carbonate-vitamin D3 (CALTRATE 600 PLUS D) 600 mg (1,500 mg)-800 unit Chew     [DISCONTINUED] erythromycin base 250 MG Tab Take 1 tablet (250 mg total) by mouth 3 (three) times daily with meals.    [DISCONTINUED] fexofenadine (ALLEGRA) 60 MG tablet Allegra Allergy Take 2 times per day No date recorded tablet 2 times per day No route recorded No set duration recorded No set duration amount recorded active 60 mg    [DISCONTINUED] montelukast 4 MG chewable tablet Singulair Take 1 time per day No date recorded No form recorded 1 time per day No route recorded No set duration recorded No set duration amount recorded active No dosage strength recorded No dosage strength units of measure recorded    [DISCONTINUED] mupirocin (BACTROBAN) 2 % ointment     [DISCONTINUED] olmesartan (BENICAR) 20 MG tablet      Family History       Problem Relation (Age of Onset)    Breast cancer Maternal Aunt    Colon cancer Mother    Diabetes type II Maternal Aunt    Hypertension Maternal Grandmother, Maternal Grandfather, Paternal Grandmother, Paternal Grandfather    Uterine cancer Maternal Aunt          Tobacco Use    Smoking status: Never    Smokeless tobacco: Never   Substance and Sexual Activity    Alcohol use: Never    Drug use: Never    Sexual activity: Not Currently     Partners: Male     Review of Systems   Constitutional: Negative for decreased appetite, diaphoresis, fever, malaise/fatigue and night  sweats.   HENT:  Negative for nosebleeds.    Eyes:  Negative for blurred vision and double vision.   Cardiovascular:  Positive for near-syncope and palpitations. Negative for chest pain, claudication, dyspnea on exertion, irregular heartbeat, leg swelling, orthopnea, paroxysmal nocturnal dyspnea and syncope.   Respiratory:  Negative for cough, shortness of breath, sleep disturbances due to breathing, snoring, sputum production and wheezing.    Endocrine: Negative for cold intolerance and polyuria.   Hematologic/Lymphatic: Does not bruise/bleed easily.   Skin:  Negative for rash.   Musculoskeletal:  Negative for back pain, falls, joint pain, joint swelling and neck pain.   Gastrointestinal:  Negative for abdominal pain, heartburn, nausea and vomiting.   Genitourinary:  Negative for dysuria, frequency and hematuria.   Neurological:  Negative for difficulty with concentration, dizziness, focal weakness, headaches, light-headedness, numbness, seizures and weakness.   Psychiatric/Behavioral:  Negative for depression, memory loss and substance abuse. The patient does not have insomnia.    Allergic/Immunologic: Negative for HIV exposure and hives.   Objective:     Vital Signs (Most Recent):  Temp: 97.8 °F (36.6 °C) (11/11/22 1550)  Pulse: (!) 58 (11/11/22 1700)  Resp: 20 (11/11/22 1700)  BP: (!) 186/81 (11/11/22 1700)  SpO2: 98 % (11/11/22 1700)   Vital Signs (24h Range):  Temp:  [97.8 °F (36.6 °C)] 97.8 °F (36.6 °C)  Pulse:  [] 58  Resp:  [20] 20  SpO2:  [98 %-100 %] 98 %  BP: (170-186)/(80-86) 186/81     Weight: 69 kg (152 lb 1.9 oz)  Body mass index is 21.22 kg/m².    SpO2: 98 %  O2 Device (Oxygen Therapy): room air    No intake or output data in the 24 hours ending 11/11/22 1755    Lines/Drains/Airways       Peripheral Intravenous Line  Duration                  Peripheral IV - Single Lumen 11/11/22 1642 20 G Anterior;Left Forearm <1 day                    Physical Exam  HENT:      Head: Normocephalic.   Eyes:       Pupils: Pupils are equal, round, and reactive to light.   Neck:      Thyroid: No thyromegaly.      Vascular: Normal carotid pulses. No carotid bruit or JVD.   Cardiovascular:      Rate and Rhythm: Normal rate and regular rhythm. No extrasystoles are present.     Chest Wall: PMI is not displaced.      Pulses: Normal pulses.      Heart sounds: Normal heart sounds. No murmur heard.    No gallop. No S3 sounds.   Pulmonary:      Effort: No respiratory distress.      Breath sounds: Normal breath sounds. No stridor.   Abdominal:      General: Bowel sounds are normal.      Palpations: Abdomen is soft.      Tenderness: There is no abdominal tenderness. There is no rebound.   Musculoskeletal:         General: Normal range of motion.   Skin:     Findings: No rash.   Neurological:      Mental Status: She is alert and oriented to person, place, and time.   Psychiatric:         Behavior: Behavior normal.       Significant Labs: ABG: No results for input(s): PH, PCO2, HCO3, POCSATURATED, BE in the last 48 hours., Blood Culture: No results for input(s): LABBLOO in the last 48 hours., BMP:   Recent Labs   Lab 11/11/22  1629 11/11/22  1636   GLU 97  --      --    K 3.9  --      --    CO2 20*  --    BUN 10  --    CREATININE 0.8  --    CALCIUM 10.3  --    MG  --  2.0   , CMP   Recent Labs   Lab 11/11/22  1629      K 3.9      CO2 20*   GLU 97   BUN 10   CREATININE 0.8   CALCIUM 10.3   PROT 8.5*   ALBUMIN 4.2   BILITOT 0.9   ALKPHOS 103   AST 17   ALT 17   ANIONGAP 14   , CBC   Recent Labs   Lab 11/11/22  1629   WBC 6.45   HGB 15.5   HCT 46.9      , INR   Recent Labs   Lab 11/11/22  1639   INR 1.1   , Lipid Panel No results for input(s): CHOL, HDL, LDLCALC, TRIG, CHOLHDL in the last 48 hours., and Troponin   Recent Labs   Lab 11/11/22  1629   TROPONINI <0.006       Significant Imaging: EKG:

## 2022-11-11 NOTE — PHARMACY MED REC
"Admission Medication History     The home medication history was taken by Rian Carnes.    You may go to "Admission" then "Reconcile Home Medications" tabs to review and/or act upon these items.     The home medication list has been updated by the Pharmacy department.   Please read ALL comments highlighted in yellow.   Please address this information as you see fit.    Feel free to contact us if you have any questions or require assistance.      The medications listed below were removed from the home medication list. Please reorder if appropriate:  Patient reports no longer taking the following medication(s):  BENICAR 20MG    Medications listed below were obtained from: Patient/family and Analytic software- [employer]  (Not in a hospital admission)      Rian Carnes  [id number]    Current Outpatient Medications on File Prior to Encounter   Medication Sig Dispense Refill Last Dose    apixaban (ELIQUIS) 5 mg Tab Take 5 mg by mouth 2 (two) times daily.   11/11/2022    fexofenadine (ALLEGRA) 180 MG tablet Take 180 mg by mouth every evening.   11/10/2022    metoprolol succinate (TOPROL-XL) 25 MG 24 hr tablet Take 25 mg by mouth 2 (two) times daily.   11/11/2022    montelukast (SINGULAIR) 10 mg tablet Take 10 mg by mouth every evening.   11/10/2022                           .        "

## 2022-11-11 NOTE — FIRST PROVIDER EVALUATION
"Medical screening examination initiated.  I have conducted a focused provider triage encounter, findings are as follows:    Brief history of present illness:  72-year-old female with complaint of palpitations and near syncopal episodes today.  Patient has history of AFib.  Patient reports she feels like she is going to pass out.    Vitals:    11/11/22 1550   Pulse: 110   Resp: 20   Temp: 97.8 °F (36.6 °C)   TempSrc: Oral   SpO2: 100%   Height: 5' 11" (1.803 m)       Pertinent physical exam:  AAOX3  "

## 2022-11-11 NOTE — ED PROVIDER NOTES
"SCRIBE #1 NOTE: I, Qing Tang, am scribing for, and in the presence of, Merle Kothari MD. I have scribed the entire note.       History     Chief Complaint   Patient presents with    near syncope     Pt has Afib hx, she feels dizzy, and she is about to pass out. No chest pain or SOB.     Review of patient's allergies indicates:   Allergen Reactions    Cephalexin Anaphylaxis and Swelling     "Throat closing"    Albuterol sulfate Other (See Comments)     "Headache and shaky"    Amlodipine Other (See Comments)    Amoxicillin-pot clavulanate Hives and Itching     "Itchy hives after 4 days"    Atenolol Other (See Comments)     "Cough and wheeze"    Beta-blockers (beta-adrenergic blocking agts)     Carbacephem     Cephalosporins     Chlorpheniram-pe-chlophedianol     Clindamycin Other (See Comments)     "Severe heartburn"    Flecainide Other (See Comments)    Guaifenesin Other (See Comments)     "Stomach cramps"    Hyoscamine Diarrhea    Levsin [hyoscyamine sulfate]     Macrodantin [nitrofurantoin macrocrystal]     Meperidine Itching and Other (See Comments)     "Erythema and itching at injection site"    Penicillins     Sulfa (sulfonamide antibiotics)     Thimerosal Other (See Comments)     "Positive patch test by history"    Urimar-t [vfdgdz-mwnrp-t.blue-sal-naphos]     Vancomycin     Amitriptyline-chlordiazepoxide Rash    Azithromycin Itching and Rash    Ciprofloxacin Itching and Rash    Influenza virus vaccines Palpitations     "Fast heart rate"    Lidocaine Other (See Comments) and Palpitations     "Rapid heart beat"    Povidone-iodine Other (See Comments) and Rash     "Contact dermatitis"    Vibramycin [doxycycline hyclate] Rash         History of Present Illness     HPI    11/11/2022, 4:39 PM  History obtained from the patient      History of Present Illness: Christine Panda is a 72 y.o. female patient with a PMHx of Atrial fibrillation and Asthma who presents to the Emergency Department for evaluation " "of Afib and general body weakness which onset around 10:30 this morning. Pt states around 10:30 am her "heart started acting up." Pt claims she has no chest pain or SOB. Pt just feels generally weak all over. Symptoms are constant and moderate in severity. No mitigating or exacerbating factors reported. Patient denies any SOB, CP, Wheezing, nausea, vomiting, diarrhea, headaches, back pain, and all other sxs at this time. No further complaints or concerns at this time.       Arrival mode: Personal vehicle      PCP: Marcelo Ferreira        Past Medical History:  Past Medical History:   Diagnosis Date    Asthma     Atrial fibrillation        Past Surgical History:  Past Surgical History:   Procedure Laterality Date    APPENDECTOMY      BILATERAL SALPINGO-OOPHORECTOMY (BSO)      BLADDER SURGERY      ELBOW SURGERY      LAVH       TONSILLECTOMY           Family History:  Family History   Problem Relation Age of Onset    Colon cancer Mother     Breast cancer Maternal Aunt     Uterine cancer Maternal Aunt     Diabetes type II Maternal Aunt     Hypertension Maternal Grandmother     Hypertension Maternal Grandfather     Hypertension Paternal Grandmother     Hypertension Paternal Grandfather        Social History:  Social History     Tobacco Use    Smoking status: Never    Smokeless tobacco: Never   Substance and Sexual Activity    Alcohol use: Never    Drug use: Never    Sexual activity: Not Currently     Partners: Male        Review of Systems     Review of Systems   Constitutional:  Negative for fatigue and fever.   HENT:  Negative for sore throat.    Respiratory:  Negative for shortness of breath and wheezing.    Cardiovascular:  Positive for palpitations. Negative for chest pain.   Gastrointestinal:  Negative for nausea.   Genitourinary:  Negative for dysuria.   Musculoskeletal:  Negative for back pain.   Skin:  Negative for rash.   Neurological:  Positive for weakness. Negative for headaches.   Hematological:  Does not " bruise/bleed easily.   Psychiatric/Behavioral:  The patient is nervous/anxious.    All other systems reviewed and are negative.     Physical Exam     Initial Vitals   BP Pulse Resp Temp SpO2   11/11/22 1559 11/11/22 1550 11/11/22 1550 11/11/22 1550 11/11/22 1550   (!) 170/80 110 20 97.8 °F (36.6 °C) 100 %      MAP       --                 Physical Exam  Nursing Notes and Vital Signs Reviewed.  Constitutional: Patient is in no apparent distress. Well-developed and well-nourished.  Head: Atraumatic. Normocephalic.  Eyes: PERRL. EOM intact. Conjunctivae are not pale. No scleral icterus.  ENT: Mucous membranes are moist. Oropharynx is clear and symmetric.    Neck: Supple. Full ROM.   Cardiovascular: Irregular rhythm. No murmurs, rubs, or gallops. Distal pulses are 2+ and symmetric.  Pulmonary/Chest: No respiratory distress. Clear to auscultation bilaterally. No wheezing or rales.  Abdominal: Soft and non-distended.  There is no tenderness.  No rebound, guarding, or rigidity. Good bowel sounds.  Genitourinary: No CVA tenderness  Musculoskeletal: Moves all extremities. No obvious deformities. No edema. No calf tenderness.  Skin: Warm and dry.  Neurological:  Alert, awake, and appropriate.  Normal speech.  No acute focal neurological deficits are appreciated.  Psychiatric: Normal affect. Good eye contact. Anxious.      ED Course   Procedures  ED Vital Signs:  Vitals:    11/11/22 1630 11/11/22 1700 11/11/22 1900 11/11/22 1903   BP: (!) 173/86 (!) 186/81 (!) 164/77    Pulse: 89 (!) 58 (!) 54 61   Resp: 20 20 16    Temp:       TempSrc:       SpO2: 100% 98% 98%    Weight: 69 kg (152 lb 1.9 oz)      Height:        11/11/22 2000 11/11/22 2030 11/11/22 2100 11/11/22 2211   BP: (!) 169/79 (!) 154/70 (!) 173/108    Pulse: (!) 56 64 69 60   Resp: 20 20 20    Temp:       TempSrc:       SpO2: 97% 96% 97%    Weight:       Height:        11/11/22 2213 11/11/22 2332 11/12/22 0410 11/12/22 0602   BP: (!) 133/90 133/63 134/69    Pulse: (!)  "58 (!) 56 (!) 53    Resp: 18 18 18    Temp: 98 °F (36.7 °C) 97.7 °F (36.5 °C) 98 °F (36.7 °C)    TempSrc: Oral Oral Oral    SpO2: 95% 97% 98%    Weight:    72.1 kg (158 lb 15.2 oz)   Height:        11/12/22 0719 11/12/22 0809 11/12/22 0918   BP:  (!) 153/67 (!) 163/73   Pulse: (!) 49 (!) 52 (!) 56   Resp:  20    Temp:  98 °F (36.7 °C)    TempSrc:  Oral    SpO2:  96% 98%   Weight:  71.7 kg (158 lb)    Height:  5' 11" (1.803 m)        Abnormal Lab Results:  Labs Reviewed   CBC W/ AUTO DIFFERENTIAL - Abnormal; Notable for the following components:       Result Value    Gran % 37.7 (*)     Lymph % 48.5 (*)     All other components within normal limits   COMPREHENSIVE METABOLIC PANEL - Abnormal; Notable for the following components:    CO2 20 (*)     Total Protein 8.5 (*)     All other components within normal limits   APTT - Abnormal; Notable for the following components:    aPTT 34.7 (*)     All other components within normal limits   CK   TROPONIN I   PROTIME-INR   TSH   MAGNESIUM        All Lab Results:  Results for orders placed or performed during the hospital encounter of 11/11/22   CBC auto differential   Result Value Ref Range    WBC 6.45 3.90 - 12.70 K/uL    RBC 5.37 4.00 - 5.40 M/uL    Hemoglobin 15.5 12.0 - 16.0 g/dL    Hematocrit 46.9 37.0 - 48.5 %    MCV 87 82 - 98 fL    MCH 28.9 27.0 - 31.0 pg    MCHC 33.0 32.0 - 36.0 g/dL    RDW 13.2 11.5 - 14.5 %    Platelets 224 150 - 450 K/uL    MPV 11.3 9.2 - 12.9 fL    Immature Granulocytes 0.0 0.0 - 0.5 %    Gran # (ANC) 2.4 1.8 - 7.7 K/uL    Immature Grans (Abs) 0.00 0.00 - 0.04 K/uL    Lymph # 3.1 1.0 - 4.8 K/uL    Mono # 0.8 0.3 - 1.0 K/uL    Eos # 0.1 0.0 - 0.5 K/uL    Baso # 0.03 0.00 - 0.20 K/uL    nRBC 0 0 /100 WBC    Gran % 37.7 (L) 38.0 - 73.0 %    Lymph % 48.5 (H) 18.0 - 48.0 %    Mono % 11.6 4.0 - 15.0 %    Eosinophil % 1.7 0.0 - 8.0 %    Basophil % 0.5 0.0 - 1.9 %    Differential Method Automated    Comprehensive metabolic panel   Result Value Ref Range "    Sodium 141 136 - 145 mmol/L    Potassium 3.9 3.5 - 5.1 mmol/L    Chloride 107 95 - 110 mmol/L    CO2 20 (L) 23 - 29 mmol/L    Glucose 97 70 - 110 mg/dL    BUN 10 8 - 23 mg/dL    Creatinine 0.8 0.5 - 1.4 mg/dL    Calcium 10.3 8.7 - 10.5 mg/dL    Total Protein 8.5 (H) 6.0 - 8.4 g/dL    Albumin 4.2 3.5 - 5.2 g/dL    Total Bilirubin 0.9 0.1 - 1.0 mg/dL    Alkaline Phosphatase 103 55 - 135 U/L    AST 17 10 - 40 U/L    ALT 17 10 - 44 U/L    Anion Gap 14 8 - 16 mmol/L    eGFR >60 >60 mL/min/1.73 m^2   CPK   Result Value Ref Range    CPK 52 20 - 180 U/L   Troponin I   Result Value Ref Range    Troponin I <0.006 0.000 - 0.026 ng/mL   Protime-INR   Result Value Ref Range    Prothrombin Time 11.5 9.0 - 12.5 sec    INR 1.1 0.8 - 1.2   APTT   Result Value Ref Range    aPTT 34.7 (H) 21.0 - 32.0 sec   TSH   Result Value Ref Range    TSH 1.121 0.400 - 4.000 uIU/mL   Magnesium   Result Value Ref Range    Magnesium 2.0 1.6 - 2.6 mg/dL   Echo   Result Value Ref Range    BSA 1.89 m2    TDI SEPTAL 0.09 m/s    LV LATERAL E/E' RATIO 10.22 m/s    LV SEPTAL E/E' RATIO 10.22 m/s    LA WIDTH 3.60 cm    IVC diameter 1.90 cm    Left Ventricular Outflow Tract Mean Velocity 0.83 cm/s    Left Ventricular Outflow Tract Mean Gradient 3.04 mmHg    TV mean gradient 18 mmHg    TDI LATERAL 0.09 m/s    LVIDd 3.85 3.5 - 6.0 cm    IVS 1.34 (A) 0.6 - 1.1 cm    Posterior Wall 1.20 (A) 0.6 - 1.1 cm    Ao root annulus 3.04 cm    LVIDs 2.56 2.1 - 4.0 cm    FS 34 28 - 44 %    LA volume 40.02 cm3    Sinus 3.36 cm    STJ 3.26 cm    Ascending aorta 2.98 cm    LV mass 170.20 g    LA size 3.13 cm    TAPSE 1.91 cm    Left Ventricle Relative Wall Thickness 0.62 cm    AV mean gradient 4 mmHg    AV valve area 2.52 cm2    AV Velocity Ratio 0.92     AV index (prosthetic) 0.89     E/A ratio 1.35     Mean e' 0.09 m/s    E wave deceleration time 194.28 msec    IVRT 53.28 msec    LVOT diameter 1.90 cm    LVOT area 2.8 cm2    LVOT peak veto 1.15 m/s    LVOT peak VTI 25.40 cm     Ao peak naseem 1.25 m/s    Ao VTI 28.6 cm    RVOT peak naseem 0.71 m/s    RVOT peak VTI 19.4 cm    Mr max naseem 3.32 m/s    LVOT stroke volume 71.98 cm3    AV peak gradient 6 mmHg    PV mean gradient 1.22 mmHg    E/E' ratio 10.22 m/s    MV Peak E Naseem 0.92 m/s    TR Max Naseem 2.49 m/s    MV Peak A Naseem 0.68 m/s    LV Systolic Volume 23.75 mL    LV Systolic Volume Index 12.4 mL/m2    LV Diastolic Volume 64.03 mL    LV Diastolic Volume Index 33.52 mL/m2    LA Volume Index 21.0 mL/m2    LV Mass Index 89 g/m2    RA Major Axis 3.32 cm    Left Atrium Minor Axis 4.26 cm    Left Atrium Major Axis 4.10 cm    Triscuspid Valve Regurgitation Peak Gradient 25 mmHg    RA Width 2.62 cm    Right Atrial Pressure (from IVC) 3 mmHg    EF 55 %    TV rest pulmonary artery pressure 28 mmHg         Imaging Results:  Imaging Results              X-Ray Chest 1 View (Final result)  Result time 11/11/22 16:27:36      Final result by Mick Dasilva MD (11/11/22 16:27:36)                   Impression:      No acute findings.      Electronically signed by: Mick Dasilva MD  Date:    11/11/2022  Time:    16:27               Narrative:    EXAMINATION:  XR CHEST 1 VIEW    CLINICAL HISTORY:  Atrial fibrillation.  Syncope,    COMPARISON:  10/18/2022 x-ray    FINDINGS:  Heart size is normal.  The aortic arch is calcified.  The lung fields are clear with no obvious infiltrate or failure.                                       The EKG was ordered, reviewed, and independently interpreted by the ED provider.  Interpretation time: 16:35:28  Rate: 58 BPM  Rhythm: sinus bradycardia  Interpretation: Minimal voltage criteria for LVH, may be normal variant. Cannot rule out Anterior infarct, age undetermined. Abnormal ECG. No STEMI.           The Emergency Provider reviewed the vital signs and test results, which are outlined above.     ED Discussion       5:58 PM: Discussed case with Dr. Sutherland ([facility]). Dr. Sutherland agrees with current care and  management of pt and accepts admission.   Admitting Service: Hospital Medicine  Admitting Physician: Dr. Sutherland  Admit to: [facility]    6:09 PM: Re-evaluated pt. I have discussed test results, shared treatment plan, and the need for admission with patient and family at bedside. Pt and family express understanding at this time and agree with all information. All questions answered. Pt and family have no further questions or concerns at this time. Pt is ready for admit.           Medical Decision Making:   Clinical Tests:   Lab Tests: Ordered and Reviewed  Radiological Study: Ordered and Reviewed  Medical Tests: Ordered and Reviewed         ED Medication(s):  Medications   propafenone tablet 150 mg (150 mg Oral Given 11/12/22 0622)   apixaban tablet 5 mg (5 mg Oral Given 11/12/22 0920)   montelukast tablet 10 mg (10 mg Oral Given 11/11/22 2101)   metoprolol succinate (TOPROL-XL) 24 hr tablet 25 mg (25 mg Oral Given 11/12/22 0945)   sodium chloride 0.9% flush 10 mL (has no administration in time range)   naloxone 0.4 mg/mL injection 0.02 mg (has no administration in time range)   acetaminophen tablet 650 mg (has no administration in time range)   ondansetron injection 4 mg (has no administration in time range)   aluminum-magnesium hydroxide-simethicone 200-200-20 mg/5 mL suspension 30 mL (has no administration in time range)   melatonin tablet 6 mg (has no administration in time range)   hydrALAZINE tablet 25 mg (25 mg Oral Given 11/12/22 0920)   hydrOXYzine pamoate capsule 25 mg (25 mg Oral Given 11/11/22 1755)       Current Discharge Medication List                  Scribe Attestation:   Scribe #1: I performed the above scribed service and the documentation accurately describes the services I performed. I attest to the accuracy of the note.     Attending:   Physician Attestation Statement for Scribe #1: I, Merle Kothari MD, personally performed the services described in this documentation, as scribed by Qing  Kitty, in my presence, and it is both accurate and complete.           Clinical Impression       ICD-10-CM ICD-9-CM   1. Atrial fibrillation  I48.91 427.31   2. Near syncope  R55 780.2   3. Chronic hypertension  I10 401.9   4. Anxiety  F41.9 300.00   5. Chronic anticoagulation  Z79.01 V58.61   6. Chest pain  R07.9 786.50   7. PAF (paroxysmal atrial fibrillation)  I48.0 427.31       Disposition:   Disposition: Admitted  Condition: Garret Kothari MD  11/12/22 1010

## 2022-11-12 VITALS
OXYGEN SATURATION: 97 % | RESPIRATION RATE: 20 BRPM | HEIGHT: 71 IN | BODY MASS INDEX: 22.12 KG/M2 | HEART RATE: 54 BPM | WEIGHT: 158 LBS | DIASTOLIC BLOOD PRESSURE: 69 MMHG | SYSTOLIC BLOOD PRESSURE: 134 MMHG | TEMPERATURE: 99 F

## 2022-11-12 PROBLEM — I10 PRIMARY HYPERTENSION: Status: ACTIVE | Noted: 2022-11-12

## 2022-11-12 LAB
AORTIC ROOT ANNULUS: 3.04 CM
ASCENDING AORTA: 2.98 CM
AV INDEX (PROSTH): 0.89
AV MEAN GRADIENT: 4 MMHG
AV PEAK GRADIENT: 6 MMHG
AV VALVE AREA: 2.52 CM2
AV VELOCITY RATIO: 0.92
BSA FOR ECHO PROCEDURE: 1.89 M2
CV ECHO LV RWT: 0.62 CM
DOP CALC AO PEAK VEL: 1.25 M/S
DOP CALC AO VTI: 28.6 CM
DOP CALC LVOT AREA: 2.8 CM2
DOP CALC LVOT DIAMETER: 1.9 CM
DOP CALC LVOT PEAK VEL: 1.15 M/S
DOP CALC LVOT STROKE VOLUME: 71.98 CM3
DOP CALC RVOT PEAK VEL: 0.71 M/S
DOP CALC RVOT VTI: 19.4 CM
DOP CALCLVOT PEAK VEL VTI: 25.4 CM
E WAVE DECELERATION TIME: 194.28 MSEC
E/A RATIO: 1.35
E/E' RATIO: 10.22 M/S
ECHO LV POSTERIOR WALL: 1.2 CM (ref 0.6–1.1)
EJECTION FRACTION: 55 %
FRACTIONAL SHORTENING: 34 % (ref 28–44)
INTERVENTRICULAR SEPTUM: 1.34 CM (ref 0.6–1.1)
IVC DIAMETER: 1.9 CM
IVRT: 53.28 MSEC
LA MAJOR: 4.1 CM
LA MINOR: 4.26 CM
LA WIDTH: 3.6 CM
LEFT ATRIUM SIZE: 3.13 CM
LEFT ATRIUM VOLUME INDEX: 21 ML/M2
LEFT ATRIUM VOLUME: 40.02 CM3
LEFT INTERNAL DIMENSION IN SYSTOLE: 2.56 CM (ref 2.1–4)
LEFT VENTRICLE DIASTOLIC VOLUME INDEX: 33.52 ML/M2
LEFT VENTRICLE DIASTOLIC VOLUME: 64.03 ML
LEFT VENTRICLE MASS INDEX: 89 G/M2
LEFT VENTRICLE SYSTOLIC VOLUME INDEX: 12.4 ML/M2
LEFT VENTRICLE SYSTOLIC VOLUME: 23.75 ML
LEFT VENTRICULAR INTERNAL DIMENSION IN DIASTOLE: 3.85 CM (ref 3.5–6)
LEFT VENTRICULAR MASS: 170.2 G
LV LATERAL E/E' RATIO: 10.22 M/S
LV SEPTAL E/E' RATIO: 10.22 M/S
LVOT MG: 3.04 MMHG
LVOT MV: 0.83 CM/S
MV PEAK A VEL: 0.68 M/S
MV PEAK E VEL: 0.92 M/S
PISA MRMAX VEL: 3.32 M/S
PISA TR MAX VEL: 2.49 M/S
PV MEAN GRADIENT: 1.22 MMHG
RA MAJOR: 3.32 CM
RA PRESSURE: 3 MMHG
RA WIDTH: 2.62 CM
SINUS: 3.36 CM
STJ: 3.26 CM
TDI LATERAL: 0.09 M/S
TDI SEPTAL: 0.09 M/S
TDI: 0.09 M/S
TR MAX PG: 25 MMHG
TR MEAN GRADIENT: 18 MMHG
TRICUSPID ANNULAR PLANE SYSTOLIC EXCURSION: 1.91 CM
TV REST PULMONARY ARTERY PRESSURE: 28 MMHG

## 2022-11-12 PROCEDURE — G0378 HOSPITAL OBSERVATION PER HR: HCPCS

## 2022-11-12 PROCEDURE — 25000003 PHARM REV CODE 250: Performed by: NURSE PRACTITIONER

## 2022-11-12 PROCEDURE — 25000003 PHARM REV CODE 250: Performed by: INTERNAL MEDICINE

## 2022-11-12 PROCEDURE — 99225 PR SUBSEQUENT OBSERVATION CARE,LEVEL II: ICD-10-PCS | Mod: 25,,, | Performed by: INTERNAL MEDICINE

## 2022-11-12 PROCEDURE — 99225 PR SUBSEQUENT OBSERVATION CARE,LEVEL II: CPT | Mod: 25,,, | Performed by: INTERNAL MEDICINE

## 2022-11-12 RX ORDER — HYDRALAZINE HYDROCHLORIDE 25 MG/1
25 TABLET, FILM COATED ORAL EVERY 8 HOURS
Qty: 90 TABLET | Refills: 1 | Status: SHIPPED | OUTPATIENT
Start: 2022-11-12 | End: 2023-04-10

## 2022-11-12 RX ORDER — PROPAFENONE HYDROCHLORIDE 150 MG/1
150 TABLET, COATED ORAL EVERY 8 HOURS
Qty: 90 TABLET | Refills: 1 | Status: SHIPPED | OUTPATIENT
Start: 2022-11-12 | End: 2023-04-10

## 2022-11-12 RX ORDER — HYDRALAZINE HYDROCHLORIDE 25 MG/1
25 TABLET, FILM COATED ORAL EVERY 8 HOURS
Status: DISCONTINUED | OUTPATIENT
Start: 2022-11-13 | End: 2022-11-12

## 2022-11-12 RX ORDER — METOPROLOL SUCCINATE 25 MG/1
12.5 TABLET, EXTENDED RELEASE ORAL 2 TIMES DAILY
Qty: 90 TABLET | Refills: 0 | Status: SHIPPED | OUTPATIENT
Start: 2022-11-12 | End: 2023-04-10

## 2022-11-12 RX ORDER — HYDRALAZINE HYDROCHLORIDE 25 MG/1
25 TABLET, FILM COATED ORAL EVERY 8 HOURS
Status: DISCONTINUED | OUTPATIENT
Start: 2022-11-12 | End: 2022-11-12 | Stop reason: HOSPADM

## 2022-11-12 RX ADMIN — PROPAFENONE HYDROCHLORIDE 150 MG: 150 TABLET, FILM COATED ORAL at 01:11

## 2022-11-12 RX ADMIN — HYDRALAZINE HYDROCHLORIDE 25 MG: 25 TABLET, FILM COATED ORAL at 09:11

## 2022-11-12 RX ADMIN — APIXABAN 5 MG: 2.5 TABLET, FILM COATED ORAL at 09:11

## 2022-11-12 RX ADMIN — HYDRALAZINE HYDROCHLORIDE 25 MG: 25 TABLET, FILM COATED ORAL at 01:11

## 2022-11-12 RX ADMIN — METOPROLOL SUCCINATE 25 MG: 25 TABLET, EXTENDED RELEASE ORAL at 09:11

## 2022-11-12 RX ADMIN — PROPAFENONE HYDROCHLORIDE 150 MG: 150 TABLET, FILM COATED ORAL at 06:11

## 2022-11-12 NOTE — ASSESSMENT & PLAN NOTE
The strip showed 3.6 seconds pause before converted from afib to SR with faint    -continue Eliquis 5 mg bid and metoprolol  -add propafenone 150 mg tid  -echo in AM  -admit to obs      11/12/2022  Continue propafenone Metoprolol and Eliquis  Ok to d/c in PM if no pause

## 2022-11-12 NOTE — HOSPITAL COURSE
admitted for long pause when Afib converted to SR. With faint & palpitation, the pause was 3.4 seconds in ER. H/o allergy for flecainide and added Propanone 150 mg tid yesterday and continue BB - metoprolol 12.5 mg. High BP added hydralazine. VSS and the lab reviewed. No further pauses noted on Telemetry. No allergy to propafenone. HT at 50 to 60. ECHO today normal biv function, normal AL size. Pt denied further symptoms. She was advised to follow up with her Cardiologist, Dr. Moeller. Cardiology cleared patient for discharge. She was seen and examined and determined to be safe and stable for discharge.

## 2022-11-12 NOTE — HOSPITAL COURSE
73 yo female, cardiology consult for afib and faint  PMH AFIB  Dx of APF in  and f/u Dr. Moeller.   C/o faint and palpitation. Worsening recently  2 weeks ago, had nuke stress test and echo ordered by Dr. Moeller and negative. Had rash on face after started flecainide and stopped now  In ER ekg on 3 pm showed AFIB and converted to SR  Hgb 15 plt 200 Cr 0.8, troponin and BNP wnl  CXR no acute issue  Now on SR 65 bpm, denied chest pain dizziness and faint     11/12/2022 admitted for long pause when Afib converted to SR. With faint palpitation, the pause was 3.4 seconds in ER. H/o allergy for flecainide and added Propanone 150 mg tid yesterday and continue BB. High BP add hydralazine. VSS and the lab reviewed. No pause o/n. No allergy to propafenone. HT at 50 to 60. ECHO today normal biv function, normal AL size.

## 2022-11-12 NOTE — DISCHARGE SUMMARY
[facility] ([facility])  [facility] Medicine  Discharge Summary      Patient Name: Christine Panda  MRN: [id number]  [facility]: [id number]  Patient Class: OP- Observation  Admission Date: 11/11/2022  Hospital Length of Stay: 0 days  Discharge Date and Time:  11/12/2022 2:53 PM  Attending Physician: Reema Sutherland, *   Discharging Provider: Tara Chavarria NP  Primary Care Provider: Marcelo Ferreira    Primary Care Team: [facility] MEDICINE B    HPI:   Christine Panda is a 72 y.o. female with a PMHx of PAF on Eliquis and asthma who presented to the Emergency Department with c/o near-syncopal symptoms with lightheadedness and generalized weakness that have been intermittent episodes since around 10:30 AM this morning. No aggravating or alleviating factors. Associated palpitations. Denies any CP, SOB/MCCOLLUM, N/V, diaphoresis, neck or jaw pain, upper extremity pain, numbness/tingling, cough, ABD pain, back pain, HA, syncope, fever or chills. In the ED, patient had 3.6 second pause before converted from AF to SR in the ED with near-syncopal symptoms. Currently remains in sinus rhythm, asymptomatic and hemodynamically stable. Work-up in the ED was benign with stable electrolytes and TSH, negative troponin, and unremarkable CXR. She was evaluated by Cardiology, who started propafenone. Hospital Medicine was contacted for admission and patient placed in Observation.          * No surgery found *      Hospital Course:   admitted for long pause when Afib converted to SR. With faint & palpitation, the pause was 3.4 seconds in ER. H/o allergy for flecainide and added Propanone 150 mg tid yesterday and continue BB - metoprolol 12.5 mg. High BP added hydralazine. VSS and the lab reviewed. No further pauses noted on Telemetry. No allergy to propafenone. HT at 50 to 60. ECHO today normal biv function, normal AL size. Pt denied further symptoms. She was advised to follow up with her Cardiologist, Dr. Moeller. Cardiology  cleared patient for discharge. She was seen and examined and determined to be safe and stable for discharge.           Goals of Care Treatment Preferences:  Code Status: Full Code      Consults:   Consults (From admission, onward)        Status Ordering Provider     Inpatient consult to Cardiology  Once        Provider:  Jason Lewis MD    Completed MARCIN ZHANG          No new Assessment & Plan notes have been filed under this hospital service since the last note was generated.  Service: Hospital Medicine    Final Active Diagnoses:    Diagnosis Date Noted POA    PRINCIPAL PROBLEM:  Near syncope [R55] 11/11/2022 Yes    Primary hypertension [I10] 11/12/2022 Unknown    Paroxysmal atrial fibrillation [I48.0] 11/11/2022 Yes     Chronic      Problems Resolved During this Admission:       Discharged Condition: stable    Disposition: Home or Self Care    Follow Up:   Follow-up Information     Marcelo Ferreira Follow up in 3 day(s).    Why: Hospital Follow Up - Atrial Fibrillation with pause 3.6 seconds           Dr. Moeller. Schedule an appointment as soon as possible for a visit.    Why: Hospital Follow Up - Atrial fibrillation with 3.6 second pause                     Patient Instructions:      Notify your health care provider if you experience any of the following:  temperature >100.4     Notify your health care provider if you experience any of the following:  severe uncontrolled pain     Notify your health care provider if you experience any of the following:  persistent dizziness, light-headedness, or visual disturbances     Notify your health care provider if you experience any of the following:  increased confusion or weakness     Activity as tolerated       Significant Diagnostic Studies: Labs:   CMP   Recent Labs   Lab 11/11/22  1629      K 3.9      CO2 20*   GLU 97   BUN 10   CREATININE 0.8   CALCIUM 10.3   PROT 8.5*   ALBUMIN 4.2   BILITOT 0.9   ALKPHOS 103   AST 17   ALT 17   ANIONGAP 14    and  CBC   Recent Labs   Lab 11/11/22  1629   WBC 6.45   HGB 15.5   HCT 46.9          Pending Diagnostic Studies:     None         Medications:  Reconciled Home Medications:      Medication List      START taking these medications    hydrALAZINE 25 MG tablet  Commonly known as: APRESOLINE  Take 1 tablet (25 mg total) by mouth every 8 (eight) hours.     propafenone 150 MG Tab  Commonly known as: RHTHYMOL  Take 1 tablet (150 mg total) by mouth every 8 (eight) hours.        CHANGE how you take these medications    metoprolol succinate 25 MG 24 hr tablet  Commonly known as: TOPROL-XL  Take 0.5 tablets (12.5 mg total) by mouth 2 (two) times daily.  What changed: how much to take        CONTINUE taking these medications    apixaban 5 mg Tab  Commonly known as: ELIQUIS  Take 5 mg by mouth 2 (two) times daily.     fexofenadine 180 MG tablet  Commonly known as: ALLEGRA  Take 180 mg by mouth every evening.     montelukast 10 mg tablet  Commonly known as: SINGULAIR  Take 10 mg by mouth every evening.            Indwelling Lines/Drains at time of discharge:   Lines/Drains/Airways     None                 Time spent on the discharge of patient: > 30 minutes         Tara Chavarria NP  Department of Hospital Medicine  [facility] ([facility])

## 2022-11-12 NOTE — PROGRESS NOTES
[facility] ([facility])  Cardiology  Progress Note    Patient Name: Christine Panda  MRN: [id number]  Admission Date: 11/11/2022  Hospital Length of Stay: 0 days  Code Status: Full Code   Attending Physician: Reema Sutherland, *   Primary Care Physician: Marcelo Ferreira  Expected Discharge Date:   Principal Problem:Near syncope    Subjective:     Hospital Course:   73 yo female, cardiology consult for afib and faint  PMH AFIB  Dx of APF in  and f/u Dr. Moeller.   C/o faint and palpitation. Worsening recently  2 weeks ago, had nuke stress test and echo ordered by Dr. Moeller and negative. Had rash on face after started flecainide and stopped now  In ER ekg on 3 pm showed AFIB and converted to SR  Hgb 15 plt 200 Cr 0.8, troponin and BNP wnl  CXR no acute issue  Now on SR 65 bpm, denied chest pain dizziness and faint     11/12/2022 admitted for long pause when Afib converted to SR. With faint palpitation, the pause was 3.4 seconds in ER. H/o allergy for flecainide and added Propanone 150 mg tid yesterday and continue BB. High BP add hydralazine. VSS and the lab reviewed. No pause o/n. No allergy to propafenone. HT at 50 to 60. ECHO today normal biv function, normal AL size.           Review of Systems   Constitutional: Negative for decreased appetite, diaphoresis, fever, malaise/fatigue and night sweats.   HENT:  Negative for nosebleeds.    Eyes:  Negative for blurred vision and double vision.   Cardiovascular:  Negative for chest pain, claudication, dyspnea on exertion, irregular heartbeat, leg swelling, near-syncope, orthopnea, palpitations, paroxysmal nocturnal dyspnea and syncope.   Respiratory:  Negative for cough, shortness of breath, sleep disturbances due to breathing, snoring, sputum production and wheezing.    Endocrine: Negative for cold intolerance and polyuria.   Hematologic/Lymphatic: Does not bruise/bleed easily.   Skin:  Negative for rash.   Musculoskeletal:  Negative for back pain,  falls, joint pain, joint swelling and neck pain.   Gastrointestinal:  Negative for abdominal pain, heartburn, nausea and vomiting.   Genitourinary:  Negative for dysuria, frequency and hematuria.   Neurological:  Negative for difficulty with concentration, dizziness, focal weakness, headaches, light-headedness, numbness, seizures and weakness.   Psychiatric/Behavioral:  Negative for depression, memory loss and substance abuse. The patient does not have insomnia.    Allergic/Immunologic: Negative for HIV exposure and hives.   Objective:     Vital Signs (Most Recent):  Temp: 98 °F (36.7 °C) (11/12/22 0809)  Pulse: (!) 56 (11/12/22 0918)  Resp: 20 (11/12/22 0809)  BP: (!) 163/73 (11/12/22 0918)  SpO2: 98 % (11/12/22 0918)   Vital Signs (24h Range):  Temp:  [97.7 °F (36.5 °C)-98 °F (36.7 °C)] 98 °F (36.7 °C)  Pulse:  [] 56  Resp:  [16-20] 20  SpO2:  [95 %-100 %] 98 %  BP: (133-186)/() 163/73     Weight: 71.7 kg (158 lb)  Body mass index is 22.04 kg/m².     SpO2: 98 %  O2 Device (Oxygen Therapy): room air      Intake/Output Summary (Last 24 hours) at 11/12/2022 1015  Last data filed at 11/12/2022 0800  Gross per 24 hour   Intake 240 ml   Output --   Net 240 ml       Lines/Drains/Airways       Peripheral Intravenous Line  Duration                  Peripheral IV - Single Lumen 11/11/22 1642 20 G Anterior;Left Forearm <1 day                    Physical Exam  HENT:      Head: Normocephalic.   Eyes:      Pupils: Pupils are equal, round, and reactive to light.   Neck:      Thyroid: No thyromegaly.      Vascular: Normal carotid pulses. No carotid bruit or JVD.   Cardiovascular:      Rate and Rhythm: Normal rate and regular rhythm. No extrasystoles are present.     Chest Wall: PMI is not displaced.      Pulses: Normal pulses.      Heart sounds: Normal heart sounds. No murmur heard.    No gallop. No S3 sounds.   Pulmonary:      Effort: No respiratory distress.      Breath sounds: Normal breath sounds. No stridor.    Abdominal:      General: Bowel sounds are normal.      Palpations: Abdomen is soft.      Tenderness: There is no abdominal tenderness. There is no rebound.   Musculoskeletal:         General: Normal range of motion.   Skin:     Findings: No rash.   Neurological:      Mental Status: She is alert and oriented to person, place, and time.   Psychiatric:         Behavior: Behavior normal.       Significant Labs: ABG: No results for input(s): PH, PCO2, HCO3, POCSATURATED, BE in the last 48 hours., Blood Culture: No results for input(s): LABBLOO in the last 48 hours., BMP:   Recent Labs   Lab 11/11/22  1629 11/11/22  1636   GLU 97  --      --    K 3.9  --      --    CO2 20*  --    BUN 10  --    CREATININE 0.8  --    CALCIUM 10.3  --    MG  --  2.0   , CMP   Recent Labs   Lab 11/11/22  1629      K 3.9      CO2 20*   GLU 97   BUN 10   CREATININE 0.8   CALCIUM 10.3   PROT 8.5*   ALBUMIN 4.2   BILITOT 0.9   ALKPHOS 103   AST 17   ALT 17   ANIONGAP 14   , CBC   Recent Labs   Lab 11/11/22  1629   WBC 6.45   HGB 15.5   HCT 46.9      , INR   Recent Labs   Lab 11/11/22  1639   INR 1.1   , Lipid Panel No results for input(s): CHOL, HDL, LDLCALC, TRIG, CHOLHDL in the last 48 hours., and Troponin   Recent Labs   Lab 11/11/22  1629   TROPONINI <0.006       Significant Imaging: EKG:      Assessment and Plan:       Primary hypertension  Add Hydralazine 25 mg tid for HTN    Paroxysmal atrial fibrillation  The strip showed 3.6 seconds pause before converted from afib to SR with faint    -continue Eliquis 5 mg bid and metoprolol  -add propafenone 150 mg tid  -echo in AM  -admit to obs      11/12/2022  Continue propafenone Metoprolol and Eliquis  Ok to d/c in PM if no pause        VTE Risk Mitigation (From admission, onward)         Ordered     apixaban tablet 5 mg  2 times daily         11/11/22 1915     IP VTE HIGH RISK PATIENT  Once         11/11/22 1915     Place sequential compression device  Until  discontinued         11/11/22 1915     Reason for No Pharmacological VTE Prophylaxis  Once        Question:  Reasons:  Answer:  Already adequately anticoagulated on oral Anticoagulants    11/11/22 1915                Jason Lewis MD  Cardiology  [facility] - Tele[facility] ([facility])

## 2022-11-12 NOTE — HPI
Christine Panda is a 72 y.o. female with a PMHx of PAF on Eliquis and asthma who presented to the Emergency Department with c/o near-syncopal symptoms with lightheadedness and generalized weakness that have been intermittent episodes since around 10:30 AM this morning. No aggravating or alleviating factors. Associated palpitations. Denies any CP, SOB/MCCOLLUM, N/V, diaphoresis, neck or jaw pain, upper extremity pain, numbness/tingling, cough, ABD pain, back pain, HA, syncope, fever or chills. In the ED, patient had 3.6 second pause before converted from AF to SR in the ED with near-syncopal symptoms. Currently remains in sinus rhythm, asymptomatic and hemodynamically stable. Work-up in the ED was benign with stable electrolytes and TSH, negative troponin, and unremarkable CXR. She was evaluated by Cardiology, who started propafenone. Hospital Medicine was contacted for admission and patient placed in Observation.

## 2022-11-12 NOTE — ASSESSMENT & PLAN NOTE
- Patient had 3.6 second pause before converted from AF to SR in the ED with near-syncopal symptoms. Currently in sinus rhythm, asymptomatic and hemodynamically stable. Monitor telemetry.  - Electrolytes and TSH stable. Troponin negative.   - Cardiology consulted and added propafenone 150 mg TID.  - Continue home BB.  - Continue Eliquis for AC.  - 2D echo in AM.

## 2022-11-12 NOTE — SUBJECTIVE & OBJECTIVE
"Past Medical History:   Diagnosis Date    Asthma     Atrial fibrillation        Past Surgical History:   Procedure Laterality Date    APPENDECTOMY      BILATERAL SALPINGO-OOPHORECTOMY (BSO)      BLADDER SURGERY      ELBOW SURGERY      LAVH       TONSILLECTOMY         Review of patient's allergies indicates:   Allergen Reactions    Cephalexin Anaphylaxis and Swelling     "Throat closing"    Albuterol sulfate Other (See Comments)     "Headache and shaky"    Amlodipine Other (See Comments)    Amoxicillin-pot clavulanate Hives and Itching     "Itchy hives after 4 days"    Atenolol Other (See Comments)     "Cough and wheeze"    Beta-blockers (beta-adrenergic blocking agts)     Carbacephem     Cephalosporins     Chlorpheniram-pe-chlophedianol     Clindamycin Other (See Comments)     "Severe heartburn"    Flecainide Other (See Comments)    Guaifenesin Other (See Comments)     "Stomach cramps"    Hyoscamine Diarrhea    Levsin [hyoscyamine sulfate]     Macrodantin [nitrofurantoin macrocrystal]     Meperidine Itching and Other (See Comments)     "Erythema and itching at injection site"    Penicillins     Sulfa (sulfonamide antibiotics)     Thimerosal Other (See Comments)     "Positive patch test by history"    Urimar-t [[id number]-[id number].blue-sal-naphos]     Vancomycin     Amitriptyline-chlordiazepoxide Rash    Azithromycin Itching and Rash    Ciprofloxacin Itching and Rash    Influenza virus vaccines Palpitations     "Fast heart rate"    Lidocaine Other (See Comments) and Palpitations     "Rapid heart beat"    Povidone-iodine Other (See Comments) and Rash     "Contact dermatitis"    Vibramycin [doxycycline hyclate] Rash       No current facility-administered medications on file prior to encounter.     Current Outpatient Medications on File Prior to Encounter   Medication Sig    apixaban (ELIQUIS) 5 mg Tab Take 5 mg by mouth 2 (two) times daily.    fexofenadine (ALLEGRA) 180 MG tablet Take 180 mg by mouth every evening.    " metoprolol succinate (TOPROL-XL) 25 MG 24 hr tablet Take 25 mg by mouth 2 (two) times daily.    montelukast (SINGULAIR) 10 mg tablet Take 10 mg by mouth every evening.    [DISCONTINUED] calcium carbonate-vitamin D3 (CALCIUM 600 WITH VITAMIN D3) 600 mg(1,500mg) -400 unit Chew Caltrate 600 + D Take No date recorded No form recorded No frequency recorded No route recorded No set duration recorded No set duration amount recorded suspended No dosage strength recorded No dosage strength units of measure recorded    [DISCONTINUED] calcium carbonate-vitamin D3 (CALTRATE 600 PLUS D) 600 mg (1,500 mg)-800 unit Chew     [DISCONTINUED] erythromycin base 250 MG Tab Take 1 tablet (250 mg total) by mouth 3 (three) times daily with meals.    [DISCONTINUED] fexofenadine (ALLEGRA) 60 MG tablet Allegra Allergy Take 2 times per day No date recorded tablet 2 times per day No route recorded No set duration recorded No set duration amount recorded active 60 mg    [DISCONTINUED] montelukast 4 MG chewable tablet Singulair Take 1 time per day No date recorded No form recorded 1 time per day No route recorded No set duration recorded No set duration amount recorded active No dosage strength recorded No dosage strength units of measure recorded    [DISCONTINUED] mupirocin (BACTROBAN) 2 % ointment     [DISCONTINUED] olmesartan (BENICAR) 20 MG tablet      Family History       Problem Relation (Age of Onset)    Breast cancer Maternal Aunt    Colon cancer Mother    Diabetes type II Maternal Aunt    Hypertension Maternal Grandmother, Maternal Grandfather, Paternal Grandmother, Paternal Grandfather    Uterine cancer Maternal Aunt          Tobacco Use    Smoking status: Never    Smokeless tobacco: Never   Substance and Sexual Activity    Alcohol use: Never    Drug use: Never    Sexual activity: Not Currently     Partners: Male     Review of Systems   Constitutional:  Negative for chills, diaphoresis, fatigue and fever.   HENT:  Negative for  congestion and sore throat.    Respiratory:  Negative for cough, shortness of breath and wheezing.    Cardiovascular:  Positive for palpitations. Negative for chest pain and leg swelling.   Gastrointestinal:  Negative for abdominal pain, constipation, diarrhea, nausea and vomiting.   Genitourinary:  Negative for dysuria and hematuria.   Musculoskeletal:  Negative for arthralgias, back pain and myalgias.   Skin:  Negative for pallor and rash.   Neurological:  Positive for syncope (near), weakness (generalized) and light-headedness. Negative for dizziness, numbness and headaches.   Psychiatric/Behavioral:  Negative for confusion. The patient is not nervous/anxious.    All other systems reviewed and are negative.  Objective:     Vital Signs (Most Recent):  Temp: 97.8 °F (36.6 °C) (11/11/22 1550)  Pulse: (!) 58 (11/11/22 1700)  Resp: 20 (11/11/22 1700)  BP: (!) 186/81 (11/11/22 1700)  SpO2: 98 % (11/11/22 1700)   Vital Signs (24h Range):  Temp:  [97.8 °F (36.6 °C)] 97.8 °F (36.6 °C)  Pulse:  [] 58  Resp:  [20] 20  SpO2:  [98 %-100 %] 98 %  BP: (170-186)/(80-86) 186/81     Weight: 69 kg (152 lb 1.9 oz)  Body mass index is 21.22 kg/m².    Physical Exam  Vitals and nursing note reviewed.   Constitutional:       General: She is awake. She is not in acute distress.     Appearance: Normal appearance. She is well-developed. She is not diaphoretic.   HENT:      Head: Normocephalic and atraumatic.   Eyes:      General: No visual field deficit.     Conjunctiva/sclera: Conjunctivae normal.      Comments: PERRL; EOM intact.   Cardiovascular:      Rate and Rhythm: Normal rate and regular rhythm.      Heart sounds: S1 normal and S2 normal. No murmur heard.    No gallop.   Pulmonary:      Effort: Pulmonary effort is normal. No tachypnea.      Breath sounds: Normal breath sounds and air entry. No wheezing, rhonchi or rales.   Abdominal:      General: Bowel sounds are normal. There is no distension.      Palpations: Abdomen is  soft.      Tenderness: There is no abdominal tenderness.   Musculoskeletal:         General: No tenderness or deformity. Normal range of motion.      Cervical back: Normal range of motion and neck supple.      Right lower leg: No edema.      Left lower leg: No edema.   Skin:     General: Skin is warm and dry.      Capillary Refill: Capillary refill takes less than 2 seconds.      Findings: No erythema or rash.   Neurological:      General: No focal deficit present.      Mental Status: She is alert and oriented to person, place, and time.      GCS: GCS eye subscore is 4. GCS verbal subscore is 5. GCS motor subscore is 6.      Cranial Nerves: Cranial nerves 2-12 are intact. No dysarthria or facial asymmetry.      Sensory: Sensation is intact.      Motor: Motor function is intact. No weakness.   Psychiatric:         Mood and Affect: Mood and affect normal.         Speech: Speech normal. Speech is not slurred.         Behavior: Behavior normal. Behavior is cooperative.           Significant Labs:  Results for orders placed or performed during the hospital encounter of 11/11/22   CBC auto differential   Result Value Ref Range    WBC 6.45 3.90 - 12.70 K/uL    RBC 5.37 4.00 - 5.40 M/uL    Hemoglobin 15.5 12.0 - 16.0 g/dL    Hematocrit 46.9 37.0 - 48.5 %    MCV 87 82 - 98 fL    MCH 28.9 27.0 - 31.0 pg    MCHC 33.0 32.0 - 36.0 g/dL    RDW 13.2 11.5 - 14.5 %    Platelets 224 150 - 450 K/uL    MPV 11.3 9.2 - 12.9 fL    Immature Granulocytes 0.0 0.0 - 0.5 %    Gran # (ANC) 2.4 1.8 - 7.7 K/uL    Immature Grans (Abs) 0.00 0.00 - 0.04 K/uL    Lymph # 3.1 1.0 - 4.8 K/uL    Mono # 0.8 0.3 - 1.0 K/uL    Eos # 0.1 0.0 - 0.5 K/uL    Baso # 0.03 0.00 - 0.20 K/uL    nRBC 0 0 /100 WBC    Gran % 37.7 (L) 38.0 - 73.0 %    Lymph % 48.5 (H) 18.0 - 48.0 %    Mono % 11.6 4.0 - 15.0 %    Eosinophil % 1.7 0.0 - 8.0 %    Basophil % 0.5 0.0 - 1.9 %    Differential Method Automated    Comprehensive metabolic panel   Result Value Ref Range    Sodium  141 136 - 145 mmol/L    Potassium 3.9 3.5 - 5.1 mmol/L    Chloride 107 95 - 110 mmol/L    CO2 20 (L) 23 - 29 mmol/L    Glucose 97 70 - 110 mg/dL    BUN 10 8 - 23 mg/dL    Creatinine 0.8 0.5 - 1.4 mg/dL    Calcium 10.3 8.7 - 10.5 mg/dL    Total Protein 8.5 (H) 6.0 - 8.4 g/dL    Albumin 4.2 3.5 - 5.2 g/dL    Total Bilirubin 0.9 0.1 - 1.0 mg/dL    Alkaline Phosphatase 103 55 - 135 U/L    AST 17 10 - 40 U/L    ALT 17 10 - 44 U/L    Anion Gap 14 8 - 16 mmol/L    eGFR >60 >60 mL/min/1.73 m^2   CPK   Result Value Ref Range    CPK 52 20 - 180 U/L   Troponin I   Result Value Ref Range    Troponin I <0.006 0.000 - 0.026 ng/mL   Protime-INR   Result Value Ref Range    Prothrombin Time 11.5 9.0 - 12.5 sec    INR 1.1 0.8 - 1.2   APTT   Result Value Ref Range    aPTT 34.7 (H) 21.0 - 32.0 sec   TSH   Result Value Ref Range    TSH 1.121 0.400 - 4.000 uIU/mL   Magnesium   Result Value Ref Range    Magnesium 2.0 1.6 - 2.6 mg/dL      All pertinent labs within the past 24 hours have been reviewed.    Significant Imaging:   Imaging Results              X-Ray Chest 1 View (Final result)  Result time 11/11/22 16:27:36      Final result by Mick Dasilva MD (11/11/22 16:27:36)                   Impression:      No acute findings.      Electronically signed by: Mick Dasilva MD  Date:    11/11/2022  Time:    16:27               Narrative:    EXAMINATION:  XR CHEST 1 VIEW    CLINICAL HISTORY:  Atrial fibrillation.  Syncope,    COMPARISON:  10/18/2022 x-ray    FINDINGS:  Heart size is normal.  The aortic arch is calcified.  The lung fields are clear with no obvious infiltrate or failure.                                     I have reviewed all pertinent imaging results/findings within the past 24 hours.      Results for orders placed or performed in visit on 11/11/22   EKG 12-lead    Collection Time: 11/11/22  3:53 PM    Narrative    Test Reason :     Vent. Rate : 114 BPM     Atrial Rate : 117 BPM     P-R Int : 000 ms          QRS  Dur : 076 ms      QT Int : 344 ms       P-R-T Axes : 000 -05 035 degrees     QTc Int : 474 ms    Undetermined rhythm  LVH with repolarization abnormality ( R in aVL )  Abnormal ECG  When compared with ECG of 18-OCT-2022 11:24,  Current undetermined rhythm precludes rhythm comparison, needs review  T wave inversion no longer evident in Inferior leads    Referred By: [email or web address]   SELF           Confirmed By:

## 2022-11-12 NOTE — ASSESSMENT & PLAN NOTE
The strip showed 3.6 seconds pause before converted from afib to SR with faint    -continue Eliquis 5 mg bid and metoprolol  -add propafenone 150 mg tid  -echo in AM  -admit to obs

## 2022-11-12 NOTE — H&P
"[facility] Medicine  History & Physical    Patient Name: Christine Panda  MRN: [id number]  Patient Class: OP- Observation  Admission Date: 11/11/2022  Attending Physician: Reema Sutherland, *   Primary Care Provider: Marcelo Ferreira         Patient information was obtained from patient, past medical records and ER records.     Subjective:     Principal Problem:Near syncope    Chief Complaint:   Chief Complaint   Patient presents with    near syncope     Pt has Afib hx, she feels dizzy, and she is about to pass out. No chest pain or SOB.        HPI: Christine Panda is a 72 y.o. female with a PMHx of PAF on Eliquis and asthma who presented to the Emergency Department with c/o near-syncopal symptoms with lightheadedness and generalized weakness that have been intermittent episodes since around 10:30 AM this morning. No aggravating or alleviating factors. Associated palpitations. Denies any CP, SOB/MCCOLLUM, N/V, diaphoresis, neck or jaw pain, upper extremity pain, numbness/tingling, cough, ABD pain, back pain, HA, syncope, fever or chills. In the ED, patient had 3.6 second pause before converted from AF to SR in the ED with near-syncopal symptoms. Currently remains in sinus rhythm, asymptomatic and hemodynamically stable. Work-up in the ED was benign with stable electrolytes and TSH, negative troponin, and unremarkable CXR. She was evaluated by Cardiology, who started propafenone. Hospital Medicine was contacted for admission and patient placed in Observation.          Past Medical History:   Diagnosis Date    Asthma     Atrial fibrillation        Past Surgical History:   Procedure Laterality Date    APPENDECTOMY      BILATERAL SALPINGO-OOPHORECTOMY (BSO)      BLADDER SURGERY      ELBOW SURGERY      [facility]       TONSILLECTOMY         Review of patient's allergies indicates:   Allergen Reactions    Cephalexin Anaphylaxis and Swelling     "Throat closing"    Albuterol " "sulfate Other (See Comments)     "Headache and shaky"    Amlodipine Other (See Comments)    Amoxicillin-pot clavulanate Hives and Itching     "Itchy hives after 4 days"    Atenolol Other (See Comments)     "Cough and wheeze"    Beta-blockers (beta-adrenergic blocking agts)     Carbacephem     Cephalosporins     Chlorpheniram-pe-chlophedianol     Clindamycin Other (See Comments)     "Severe heartburn"    Flecainide Other (See Comments)    Guaifenesin Other (See Comments)     "Stomach cramps"    Hyoscamine Diarrhea    Levsin [hyoscyamine sulfate]     Macrodantin [nitrofurantoin macrocrystal]     Meperidine Itching and Other (See Comments)     "Erythema and itching at injection site"    Penicillins     Sulfa (sulfonamide antibiotics)     Thimerosal Other (See Comments)     "Positive patch test by history"    Urimar-t [pkmfdc-yfsei-k.blue-sal-naphos]     Vancomycin     Amitriptyline-chlordiazepoxide Rash    Azithromycin Itching and Rash    Ciprofloxacin Itching and Rash    Influenza virus vaccines Palpitations     "Fast heart rate"    Lidocaine Other (See Comments) and Palpitations     "Rapid heart beat"    Povidone-iodine Other (See Comments) and Rash     "Contact dermatitis"    Vibramycin [doxycycline hyclate] Rash       No current facility-administered medications on file prior to encounter.     Current Outpatient Medications on File Prior to Encounter   Medication Sig    apixaban (ELIQUIS) 5 mg Tab Take 5 mg by mouth 2 (two) times daily.    fexofenadine (ALLEGRA) 180 MG tablet Take 180 mg by mouth every evening.    metoprolol succinate (TOPROL-XL) 25 MG 24 hr tablet Take 25 mg by mouth 2 (two) times daily.    montelukast (SINGULAIR) 10 mg tablet Take 10 mg by mouth every evening.    [DISCONTINUED] calcium carbonate-vitamin D3 (CALCIUM 600 WITH VITAMIN D3) 600 mg(1,500mg) -400 unit Chew Caltrate 600 + D Take No date recorded No form recorded No frequency recorded No route recorded No " set duration recorded No set duration amount recorded suspended No dosage strength recorded No dosage strength units of measure recorded    [DISCONTINUED] calcium carbonate-vitamin D3 (CALTRATE 600 PLUS D) 600 mg (1,500 mg)-800 unit Chew     [DISCONTINUED] erythromycin base 250 MG Tab Take 1 tablet (250 mg total) by mouth 3 (three) times daily with meals.    [DISCONTINUED] fexofenadine (ALLEGRA) 60 MG tablet Allegra Allergy Take 2 times per day No date recorded tablet 2 times per day No route recorded No set duration recorded No set duration amount recorded active 60 mg    [DISCONTINUED] montelukast 4 MG chewable tablet Singulair Take 1 time per day No date recorded No form recorded 1 time per day No route recorded No set duration recorded No set duration amount recorded active No dosage strength recorded No dosage strength units of measure recorded    [DISCONTINUED] mupirocin (BACTROBAN) 2 % ointment     [DISCONTINUED] olmesartan (BENICAR) 20 MG tablet      Family History       Problem Relation (Age of Onset)    Breast cancer Maternal Aunt    Colon cancer Mother    Diabetes type II Maternal Aunt    Hypertension Maternal Grandmother, Maternal Grandfather, Paternal Grandmother, Paternal Grandfather    Uterine cancer Maternal Aunt          Tobacco Use    Smoking status: Never    Smokeless tobacco: Never   Substance and Sexual Activity    Alcohol use: Never    Drug use: Never    Sexual activity: Not Currently     Partners: Male     Review of Systems   Constitutional:  Negative for chills, diaphoresis, fatigue and fever.   HENT:  Negative for congestion and sore throat.    Respiratory:  Negative for cough, shortness of breath and wheezing.    Cardiovascular:  Positive for palpitations. Negative for chest pain and leg swelling.   Gastrointestinal:  Negative for abdominal pain, constipation, diarrhea, nausea and vomiting.   Genitourinary:  Negative for dysuria and hematuria.   Musculoskeletal:  Negative for  arthralgias, back pain and myalgias.   Skin:  Negative for pallor and rash.   Neurological:  Positive for syncope (near), weakness (generalized) and light-headedness. Negative for dizziness, numbness and headaches.   Psychiatric/Behavioral:  Negative for confusion. The patient is not nervous/anxious.    All other systems reviewed and are negative.  Objective:     Vital Signs (Most Recent):  Temp: 97.8 °F (36.6 °C) (11/11/22 1550)  Pulse: (!) 58 (11/11/22 1700)  Resp: 20 (11/11/22 1700)  BP: (!) 186/81 (11/11/22 1700)  SpO2: 98 % (11/11/22 1700)   Vital Signs (24h Range):  Temp:  [97.8 °F (36.6 °C)] 97.8 °F (36.6 °C)  Pulse:  [] 58  Resp:  [20] 20  SpO2:  [98 %-100 %] 98 %  BP: (170-186)/(80-86) 186/81     Weight: 69 kg (152 lb 1.9 oz)  Body mass index is 21.22 kg/m².    Physical Exam  Vitals and nursing note reviewed.   Constitutional:       General: She is awake. She is not in acute distress.     Appearance: Normal appearance. She is well-developed. She is not diaphoretic.   HENT:      Head: Normocephalic and atraumatic.   Eyes:      General: No visual field deficit.     Conjunctiva/sclera: Conjunctivae normal.      Comments: PERRL; EOM intact.   Cardiovascular:      Rate and Rhythm: Normal rate and regular rhythm.      Heart sounds: S1 normal and S2 normal. No murmur heard.    No gallop.   Pulmonary:      Effort: Pulmonary effort is normal. No tachypnea.      Breath sounds: Normal breath sounds and air entry. No wheezing, rhonchi or rales.   Abdominal:      General: Bowel sounds are normal. There is no distension.      Palpations: Abdomen is soft.      Tenderness: There is no abdominal tenderness.   Musculoskeletal:         General: No tenderness or deformity. Normal range of motion.      Cervical back: Normal range of motion and neck supple.      Right lower leg: No edema.      Left lower leg: No edema.   Skin:     General: Skin is warm and dry.      Capillary Refill: Capillary refill takes less than 2  seconds.      Findings: No erythema or rash.   Neurological:      General: No focal deficit present.      Mental Status: She is alert and oriented to person, place, and time.      GCS: GCS eye subscore is 4. GCS verbal subscore is 5. GCS motor subscore is 6.      Cranial Nerves: Cranial nerves 2-12 are intact. No dysarthria or facial asymmetry.      Sensory: Sensation is intact.      Motor: Motor function is intact. No weakness.   Psychiatric:         Mood and Affect: Mood and affect normal.         Speech: Speech normal. Speech is not slurred.         Behavior: Behavior normal. Behavior is cooperative.           Significant Labs:  Results for orders placed or performed during the hospital encounter of 11/11/22   CBC auto differential   Result Value Ref Range    WBC 6.45 3.90 - 12.70 K/uL    RBC 5.37 4.00 - 5.40 M/uL    Hemoglobin 15.5 12.0 - 16.0 g/dL    Hematocrit 46.9 37.0 - 48.5 %    MCV 87 82 - 98 fL    MCH 28.9 27.0 - 31.0 pg    MCHC 33.0 32.0 - 36.0 g/dL    RDW 13.2 11.5 - 14.5 %    Platelets 224 150 - 450 K/uL    MPV 11.3 9.2 - 12.9 fL    Immature Granulocytes 0.0 0.0 - 0.5 %    Gran # (ANC) 2.4 1.8 - 7.7 K/uL    Immature Grans (Abs) 0.00 0.00 - 0.04 K/uL    Lymph # 3.1 1.0 - 4.8 K/uL    Mono # 0.8 0.3 - 1.0 K/uL    Eos # 0.1 0.0 - 0.5 K/uL    Baso # 0.03 0.00 - 0.20 K/uL    nRBC 0 0 /100 WBC    Gran % 37.7 (L) 38.0 - 73.0 %    Lymph % 48.5 (H) 18.0 - 48.0 %    Mono % 11.6 4.0 - 15.0 %    Eosinophil % 1.7 0.0 - 8.0 %    Basophil % 0.5 0.0 - 1.9 %    Differential Method Automated    Comprehensive metabolic panel   Result Value Ref Range    Sodium 141 136 - 145 mmol/L    Potassium 3.9 3.5 - 5.1 mmol/L    Chloride 107 95 - 110 mmol/L    CO2 20 (L) 23 - 29 mmol/L    Glucose 97 70 - 110 mg/dL    BUN 10 8 - 23 mg/dL    Creatinine 0.8 0.5 - 1.4 mg/dL    Calcium 10.3 8.7 - 10.5 mg/dL    Total Protein 8.5 (H) 6.0 - 8.4 g/dL    Albumin 4.2 3.5 - 5.2 g/dL    Total Bilirubin 0.9 0.1 - 1.0 mg/dL    Alkaline Phosphatase  103 55 - 135 U/L    AST 17 10 - 40 U/L    ALT 17 10 - 44 U/L    Anion Gap 14 8 - 16 mmol/L    eGFR >60 >60 mL/min/1.73 m^2   CPK   Result Value Ref Range    CPK 52 20 - 180 U/L   Troponin I   Result Value Ref Range    Troponin I <0.006 0.000 - 0.026 ng/mL   Protime-INR   Result Value Ref Range    Prothrombin Time 11.5 9.0 - 12.5 sec    INR 1.1 0.8 - 1.2   APTT   Result Value Ref Range    aPTT 34.7 (H) 21.0 - 32.0 sec   TSH   Result Value Ref Range    TSH 1.121 0.400 - 4.000 uIU/mL   Magnesium   Result Value Ref Range    Magnesium 2.0 1.6 - 2.6 mg/dL      All pertinent labs within the past 24 hours have been reviewed.    Significant Imaging:   Imaging Results              X-Ray Chest 1 View (Final result)  Result time 11/11/22 16:27:36      Final result by Mick Dasilva MD (11/11/22 16:27:36)                   Impression:      No acute findings.      Electronically signed by: Mick Dasilva MD  Date:    11/11/2022  Time:    16:27               Narrative:    EXAMINATION:  XR CHEST 1 VIEW    CLINICAL HISTORY:  Atrial fibrillation.  Syncope,    COMPARISON:  10/18/2022 x-ray    FINDINGS:  Heart size is normal.  The aortic arch is calcified.  The lung fields are clear with no obvious infiltrate or failure.                                     I have reviewed all pertinent imaging results/findings within the past 24 hours.      Results for orders placed or performed in visit on 11/11/22   EKG 12-lead    Collection Time: 11/11/22  3:53 PM    Narrative    Test Reason :     Vent. Rate : 114 BPM     Atrial Rate : 117 BPM     P-R Int : 000 ms          QRS Dur : 076 ms      QT Int : 344 ms       P-R-T Axes : 000 -05 035 degrees     QTc Int : 474 ms    Undetermined rhythm  LVH with repolarization abnormality ( R in aVL )  Abnormal ECG  When compared with ECG of 18-OCT-2022 11:24,  Current undetermined rhythm precludes rhythm comparison, needs review  T wave inversion no longer evident in Inferior leads    Referred By:  AAAREFERR   SELF           Confirmed By:                  Assessment/Plan:     * Near syncope  - Secondary to AF with pauses. Cardiology following. Continue plan below.        Paroxysmal atrial fibrillation  - Patient had 3.6 second pause before converted from AF to SR in the ED with near-syncopal symptoms. Currently in sinus rhythm, asymptomatic and hemodynamically stable. Monitor telemetry.  - Electrolytes and TSH stable. Troponin negative.   - Cardiology consulted and added propafenone 150 mg TID.  - Continue home BB.  - Continue Eliquis for AC.  - 2D echo in AM.        VTE Risk Mitigation (From admission, onward)         Ordered     apixaban tablet 5 mg  2 times daily         11/11/22 1915     IP VTE HIGH RISK PATIENT  Once         11/11/22 1915     Place sequential compression device  Until discontinued         11/11/22 1915     Reason for No Pharmacological VTE Prophylaxis  Once        Question:  Reasons:  Answer:  Already adequately anticoagulated on oral Anticoagulants    11/11/22 1915                   Beverly Kaba NP  Department of Hospital Medicine   '[facility] - Telemetry ([facility])

## 2022-11-12 NOTE — CONSULTS
"[facility] - Emergency Dept.  Cardiology  Consult Note    Patient Name: Christine Panda  MRN: [id number]  Admission Date: 11/11/2022  Hospital Length of Stay: 0 days  Code Status: No Order   Attending Provider: Merle Kothari MD   Consulting Provider: Jason Lewis MD  Primary Care Physician: Marcelo Ferreira  Principal Problem:<principal problem not specified>    Patient information was obtained from patient and ER records.     Inpatient consult to Cardiology  Consult performed by: Jason Lewis MD  Consult ordered by: Merle Kothari MD        Subjective:         HPI:     71 yo female, cardiology consult for afib and faint  PMH AFIB  Dx of APF in  and f/u Dr. Moeller.   C/o faint and palpitation. Worsening recently  2 weeks ago, had [facility] stress test and echo ordered by Dr. Moeller and negative. Had rash on face after started flecainide and stopped now  In ER ekg on 3 pm showed AFIB and converted to SR  Hgb 15 plt 200 Cr 0.8, troponin and BNP wnl  CXR no acute issue  Now on SR 65 bpm, denied chest pain dizziness and faint       Past Medical History:   Diagnosis Date    Asthma     Atrial fibrillation        Past Surgical History:   Procedure Laterality Date    APPENDECTOMY      BILATERAL SALPINGO-OOPHORECTOMY (BSO)      BLADDER SURGERY      ELBOW SURGERY      LAVH       TONSILLECTOMY         Review of patient's allergies indicates:   Allergen Reactions    Cephalexin Anaphylaxis and Swelling     "Throat closing"    Albuterol sulfate Other (See Comments)     "Headache and shaky"    Amlodipine Other (See Comments)    Amoxicillin-pot clavulanate Hives and Itching     "Itchy hives after 4 days"    Atenolol Other (See Comments)     "Cough and wheeze"    Beta-blockers (beta-adrenergic blocking agts)     Carbacephem     Cephalosporins     Chlorpheniram-pe-chlophedianol     Clindamycin Other (See Comments)     "Severe heartburn"    Flecainide Other (See Comments)    Guaifenesin Other (See Comments)     " ""Stomach cramps"    Hyoscamine Diarrhea    Levsin [hyoscyamine sulfate]     Macrodantin [nitrofurantoin macrocrystal]     Meperidine Itching and Other (See Comments)     "Erythema and itching at injection site"    Penicillins     Sulfa (sulfonamide antibiotics)     Thimerosal Other (See Comments)     "Positive patch test by history"    Urimar-t [[id number].blue-sal-naphos]     Vancomycin     Amitriptyline-chlordiazepoxide Rash    Azithromycin Itching and Rash    Ciprofloxacin Itching and Rash    Influenza virus vaccines Palpitations     "Fast heart rate"    Lidocaine Other (See Comments) and Palpitations     "Rapid heart beat"    Povidone-iodine Other (See Comments) and Rash     "Contact dermatitis"    Vibramycin [doxycycline hyclate] Rash       No current facility-administered medications on file prior to encounter.     Current Outpatient Medications on File Prior to Encounter   Medication Sig    apixaban (ELIQUIS) 5 mg Tab Take 5 mg by mouth 2 (two) times daily.    fexofenadine (ALLEGRA) 180 MG tablet Take 180 mg by mouth every evening.    metoprolol succinate (TOPROL-XL) 25 MG 24 hr tablet Take 25 mg by mouth 2 (two) times daily.    montelukast (SINGULAIR) 10 mg tablet Take 10 mg by mouth every evening.    [DISCONTINUED] calcium carbonate-vitamin D3 (CALCIUM 600 WITH VITAMIN D3) 600 mg(1,500mg) -400 unit Chew Caltrate 600 + D Take No date recorded No form recorded No frequency recorded No route recorded No set duration recorded No set duration amount recorded suspended No dosage strength recorded No dosage strength units of measure recorded    [DISCONTINUED] calcium carbonate-vitamin D3 (CALTRATE 600 PLUS D) 600 mg (1,500 mg)-800 unit Chew     [DISCONTINUED] erythromycin base 250 MG Tab Take 1 tablet (250 mg total) by mouth 3 (three) times daily with meals.    [DISCONTINUED] fexofenadine (ALLEGRA) 60 MG tablet Allegra Allergy Take 2 times per day No date recorded tablet 2 times per day " No route recorded No set duration recorded No set duration amount recorded active 60 mg    [DISCONTINUED] montelukast 4 MG chewable tablet Singulair Take 1 time per day No date recorded No form recorded 1 time per day No route recorded No set duration recorded No set duration amount recorded active No dosage strength recorded No dosage strength units of measure recorded    [DISCONTINUED] mupirocin (BACTROBAN) 2 % ointment     [DISCONTINUED] olmesartan (BENICAR) 20 MG tablet      Family History       Problem Relation (Age of Onset)    Breast cancer Maternal Aunt    Colon cancer Mother    Diabetes type II Maternal Aunt    Hypertension Maternal Grandmother, Maternal Grandfather, Paternal Grandmother, Paternal Grandfather    Uterine cancer Maternal Aunt          Tobacco Use    Smoking status: Never    Smokeless tobacco: Never   Substance and Sexual Activity    Alcohol use: Never    Drug use: Never    Sexual activity: Not Currently     Partners: Male     Review of Systems   Constitutional: Negative for decreased appetite, diaphoresis, fever, malaise/fatigue and night sweats.   HENT:  Negative for nosebleeds.    Eyes:  Negative for blurred vision and double vision.   Cardiovascular:  Positive for near-syncope and palpitations. Negative for chest pain, claudication, dyspnea on exertion, irregular heartbeat, leg swelling, orthopnea, paroxysmal nocturnal dyspnea and syncope.   Respiratory:  Negative for cough, shortness of breath, sleep disturbances due to breathing, snoring, sputum production and wheezing.    Endocrine: Negative for cold intolerance and polyuria.   Hematologic/Lymphatic: Does not bruise/bleed easily.   Skin:  Negative for rash.   Musculoskeletal:  Negative for back pain, falls, joint pain, joint swelling and neck pain.   Gastrointestinal:  Negative for abdominal pain, heartburn, nausea and vomiting.   Genitourinary:  Negative for dysuria, frequency and hematuria.   Neurological:  Negative for  difficulty with concentration, dizziness, focal weakness, headaches, light-headedness, numbness, seizures and weakness.   Psychiatric/Behavioral:  Negative for depression, memory loss and substance abuse. The patient does not have insomnia.    Allergic/Immunologic: Negative for HIV exposure and hives.   Objective:     Vital Signs (Most Recent):  Temp: 97.8 °F (36.6 °C) (11/11/22 1550)  Pulse: (!) 58 (11/11/22 1700)  Resp: 20 (11/11/22 1700)  BP: (!) 186/81 (11/11/22 1700)  SpO2: 98 % (11/11/22 1700)   Vital Signs (24h Range):  Temp:  [97.8 °F (36.6 °C)] 97.8 °F (36.6 °C)  Pulse:  [] 58  Resp:  [20] 20  SpO2:  [98 %-100 %] 98 %  BP: (170-186)/(80-86) 186/81     Weight: 69 kg (152 lb 1.9 oz)  Body mass index is 21.22 kg/m².    SpO2: 98 %  O2 Device (Oxygen Therapy): room air    No intake or output data in the 24 hours ending 11/11/22 1755    Lines/Drains/Airways       Peripheral Intravenous Line  Duration                  Peripheral IV - Single Lumen 11/11/22 1642 20 G Anterior;Left Forearm <1 day                    Physical Exam  HENT:      Head: Normocephalic.   Eyes:      Pupils: Pupils are equal, round, and reactive to light.   Neck:      Thyroid: No thyromegaly.      Vascular: Normal carotid pulses. No carotid bruit or JVD.   Cardiovascular:      Rate and Rhythm: Normal rate and regular rhythm. No extrasystoles are present.     Chest Wall: PMI is not displaced.      Pulses: Normal pulses.      Heart sounds: Normal heart sounds. No murmur heard.    No gallop. No S3 sounds.   Pulmonary:      Effort: No respiratory distress.      Breath sounds: Normal breath sounds. No stridor.   Abdominal:      General: Bowel sounds are normal.      Palpations: Abdomen is soft.      Tenderness: There is no abdominal tenderness. There is no rebound.   Musculoskeletal:         General: Normal range of motion.   Skin:     Findings: No rash.   Neurological:      Mental Status: She is alert and oriented to person, place, and time.    Psychiatric:         Behavior: Behavior normal.       Significant Labs: ABG: No results for input(s): PH, PCO2, HCO3, POCSATURATED, BE in the last 48 hours., Blood Culture: No results for input(s): LABBLOO in the last 48 hours., BMP:   Recent Labs   Lab 11/11/22  1629 11/11/22  1636   GLU 97  --      --    K 3.9  --      --    CO2 20*  --    BUN 10  --    CREATININE 0.8  --    CALCIUM 10.3  --    MG  --  2.0   , CMP   Recent Labs   Lab 11/11/22  1629      K 3.9      CO2 20*   GLU 97   BUN 10   CREATININE 0.8   CALCIUM 10.3   PROT 8.5*   ALBUMIN 4.2   BILITOT 0.9   ALKPHOS 103   AST 17   ALT 17   ANIONGAP 14   , CBC   Recent Labs   Lab 11/11/22  1629   WBC 6.45   HGB 15.5   HCT 46.9      , INR   Recent Labs   Lab 11/11/22  1639   INR 1.1   , Lipid Panel No results for input(s): CHOL, HDL, LDLCALC, TRIG, CHOLHDL in the last 48 hours., and Troponin   Recent Labs   Lab 11/11/22  1629   TROPONINI <0.006       Significant Imaging: EKG:      Assessment and Plan:     Paroxysmal atrial fibrillation  The strip showed 3.6 seconds pause before converted from afib to SR with faint    -continue Eliquis 5 mg bid and metoprolol  -add propafenone 150 mg tid  -echo in AM  -admit to obs          VTE Risk Mitigation (From admission, onward)    None          Thank you for your consult. I will follow-up with patient. Please contact us if you have any additional questions.    Jason Lewis MD  Cardiology   [facility] - Emergency Dept.

## 2022-11-12 NOTE — SUBJECTIVE & OBJECTIVE
Review of Systems   Constitutional: Negative for decreased appetite, diaphoresis, fever, malaise/fatigue and night sweats.   HENT:  Negative for nosebleeds.    Eyes:  Negative for blurred vision and double vision.   Cardiovascular:  Negative for chest pain, claudication, dyspnea on exertion, irregular heartbeat, leg swelling, near-syncope, orthopnea, palpitations, paroxysmal nocturnal dyspnea and syncope.   Respiratory:  Negative for cough, shortness of breath, sleep disturbances due to breathing, snoring, sputum production and wheezing.    Endocrine: Negative for cold intolerance and polyuria.   Hematologic/Lymphatic: Does not bruise/bleed easily.   Skin:  Negative for rash.   Musculoskeletal:  Negative for back pain, falls, joint pain, joint swelling and neck pain.   Gastrointestinal:  Negative for abdominal pain, heartburn, nausea and vomiting.   Genitourinary:  Negative for dysuria, frequency and hematuria.   Neurological:  Negative for difficulty with concentration, dizziness, focal weakness, headaches, light-headedness, numbness, seizures and weakness.   Psychiatric/Behavioral:  Negative for depression, memory loss and substance abuse. The patient does not have insomnia.    Allergic/Immunologic: Negative for HIV exposure and hives.   Objective:     Vital Signs (Most Recent):  Temp: 98 °F (36.7 °C) (11/12/22 0809)  Pulse: (!) 56 (11/12/22 0918)  Resp: 20 (11/12/22 0809)  BP: (!) 163/73 (11/12/22 0918)  SpO2: 98 % (11/12/22 0918)   Vital Signs (24h Range):  Temp:  [97.7 °F (36.5 °C)-98 °F (36.7 °C)] 98 °F (36.7 °C)  Pulse:  [] 56  Resp:  [16-20] 20  SpO2:  [95 %-100 %] 98 %  BP: (133-186)/() 163/73     Weight: 71.7 kg (158 lb)  Body mass index is 22.04 kg/m².     SpO2: 98 %  O2 Device (Oxygen Therapy): room air      Intake/Output Summary (Last 24 hours) at 11/12/2022 1015  Last data filed at 11/12/2022 0800  Gross per 24 hour   Intake 240 ml   Output --   Net 240 ml       Lines/Drains/Airways        Peripheral Intravenous Line  Duration                  Peripheral IV - Single Lumen 11/11/22 1642 20 G Anterior;Left Forearm <1 day                    Physical Exam  HENT:      Head: Normocephalic.   Eyes:      Pupils: Pupils are equal, round, and reactive to light.   Neck:      Thyroid: No thyromegaly.      Vascular: Normal carotid pulses. No carotid bruit or JVD.   Cardiovascular:      Rate and Rhythm: Normal rate and regular rhythm. No extrasystoles are present.     Chest Wall: PMI is not displaced.      Pulses: Normal pulses.      Heart sounds: Normal heart sounds. No murmur heard.    No gallop. No S3 sounds.   Pulmonary:      Effort: No respiratory distress.      Breath sounds: Normal breath sounds. No stridor.   Abdominal:      General: Bowel sounds are normal.      Palpations: Abdomen is soft.      Tenderness: There is no abdominal tenderness. There is no rebound.   Musculoskeletal:         General: Normal range of motion.   Skin:     Findings: No rash.   Neurological:      Mental Status: She is alert and oriented to person, place, and time.   Psychiatric:         Behavior: Behavior normal.       Significant Labs: ABG: No results for input(s): PH, PCO2, HCO3, POCSATURATED, BE in the last 48 hours., Blood Culture: No results for input(s): LABBLOO in the last 48 hours., BMP:   Recent Labs   Lab 11/11/22  1629 11/11/22  1636   GLU 97  --      --    K 3.9  --      --    CO2 20*  --    BUN 10  --    CREATININE 0.8  --    CALCIUM 10.3  --    MG  --  2.0   , CMP   Recent Labs   Lab 11/11/22  1629      K 3.9      CO2 20*   GLU 97   BUN 10   CREATININE 0.8   CALCIUM 10.3   PROT 8.5*   ALBUMIN 4.2   BILITOT 0.9   ALKPHOS 103   AST 17   ALT 17   ANIONGAP 14   , CBC   Recent Labs   Lab 11/11/22  1629   WBC 6.45   HGB 15.5   HCT 46.9      , INR   Recent Labs   Lab 11/11/22  1639   INR 1.1   , Lipid Panel No results for input(s): CHOL, HDL, LDLCALC, TRIG, CHOLHDL in the last 48 hours., and  Troponin   Recent Labs   Lab 11/11/22  5589   TROPONINI <0.006       Significant Imaging: EKG:

## 2022-11-12 NOTE — PLAN OF CARE
[facility] - Telemetry (Hospital)  Discharge Final Note    Primary Care Provider: Marcelo Ferreira    Expected Discharge Date: 11/12/2022    Final Discharge Note (most recent)       Final Note - 11/12/22 1340          Final Note    Assessment Type Final Discharge Note     Anticipated Discharge Disposition Home or Self Care        Post-Acute Status    Discharge Delays None known at this time                     Important Message from Medicare             Contact Info       Marcelo Ferreira   Relationship: PCP - General        Next Steps: Follow up

## 2023-04-09 PROBLEM — L98.8 SKIN LESION OF BREAST: Status: ACTIVE | Noted: 2023-04-09

## 2023-04-10 ENCOUNTER — OFFICE VISIT (OUTPATIENT)
Dept: SURGERY | Facility: CLINIC | Age: 73
End: 2023-04-10
Payer: MEDICARE

## 2023-04-10 DIAGNOSIS — L98.8 SKIN LESION OF BREAST: ICD-10-CM

## 2023-04-10 PROCEDURE — 1160F RVW MEDS BY RX/DR IN RCRD: CPT | Mod: CPTII,S$GLB,, | Performed by: NURSE PRACTITIONER

## 2023-04-10 PROCEDURE — 1159F MED LIST DOCD IN RCRD: CPT | Mod: CPTII,S$GLB,, | Performed by: NURSE PRACTITIONER

## 2023-04-10 PROCEDURE — 99213 PR OFFICE/OUTPT VISIT, EST, LEVL III, 20-29 MIN: ICD-10-PCS | Mod: S$GLB,,, | Performed by: NURSE PRACTITIONER

## 2023-04-10 PROCEDURE — 1160F PR REVIEW ALL MEDS BY PRESCRIBER/CLIN PHARMACIST DOCUMENTED: ICD-10-PCS | Mod: CPTII,S$GLB,, | Performed by: NURSE PRACTITIONER

## 2023-04-10 PROCEDURE — 4010F PR ACE/ARB THEARPY RXD/TAKEN: ICD-10-PCS | Mod: CPTII,S$GLB,, | Performed by: NURSE PRACTITIONER

## 2023-04-10 PROCEDURE — 99213 OFFICE O/P EST LOW 20 MIN: CPT | Mod: S$GLB,,, | Performed by: NURSE PRACTITIONER

## 2023-04-10 PROCEDURE — 4010F ACE/ARB THERAPY RXD/TAKEN: CPT | Mod: CPTII,S$GLB,, | Performed by: NURSE PRACTITIONER

## 2023-04-10 PROCEDURE — 1159F PR MEDICATION LIST DOCUMENTED IN MEDICAL RECORD: ICD-10-PCS | Mod: CPTII,S$GLB,, | Performed by: NURSE PRACTITIONER

## 2023-04-10 RX ORDER — SOTALOL HYDROCHLORIDE 120 MG/1
60 TABLET ORAL EVERY 12 HOURS
COMMUNITY

## 2023-04-10 NOTE — PROGRESS NOTES
"  [facility]  MD Esdras Verma, NP-C    Chief Complaint:   Christine Panda is a 72 y.o. female presenting today for  6 month follow up. She is due for Physical Examination  She reports no interval changes on her self-breast examination.     History of Present Illness:   Mrs. Panda first presented with red tender skin lesion on the left breast that she first noticed in July. She completed one round of Erythromycin two weeks prior to her visit and the area did not improve. I&D was performed and her antibiotics were restarted. MD::: Kobe Prescott MD.    Past Medical History:   Diagnosis Date    Asthma     Atrial fibrillation     Skin lesion of breast 4/9/2023      Past Surgical History:   Procedure Laterality Date    APPENDECTOMY      BILATERAL SALPINGO-OOPHORECTOMY (BSO)      BLADDER SURGERY      ELBOW SURGERY      LAVH       TONSILLECTOMY          Current Outpatient Medications:     apixaban (ELIQUIS) 5 mg Tab, Take 5 mg by mouth 2 (two) times daily., Disp: , Rfl:     fexofenadine (ALLEGRA) 180 MG tablet, Take 180 mg by mouth every evening., Disp: , Rfl:     hydrALAZINE (APRESOLINE) 25 MG tablet, Take 1 tablet (25 mg total) by mouth every 8 (eight) hours., Disp: 90 tablet, Rfl: 1    metoprolol succinate (TOPROL-XL) 25 MG 24 hr tablet, Take 0.5 tablets (12.5 mg total) by mouth 2 (two) times daily., Disp: 90 tablet, Rfl: 0    montelukast (SINGULAIR) 10 mg tablet, Take 10 mg by mouth every evening., Disp: , Rfl:     propafenone (RHTHYMOL) 150 MG Tab, Take 1 tablet (150 mg total) by mouth every 8 (eight) hours., Disp: 90 tablet, Rfl: 1   Review of patient's allergies indicates:   Allergen Reactions    Cephalexin Anaphylaxis and Swelling     "Throat closing"    Albuterol sulfate Other (See Comments)     "Headache and shaky"    Amlodipine Other (See Comments)    Amoxicillin-pot clavulanate Hives and Itching     "Itchy hives after 4 days"    Atenolol Other " "(See Comments)     "Cough and wheeze"    Beta-blockers (beta-adrenergic blocking agts)     Carbacephem     Cephalosporins     Chlorpheniram-pe-chlophedianol     Clindamycin Other (See Comments)     "Severe heartburn"    Flecainide Other (See Comments)    Guaifenesin Other (See Comments)     "Stomach cramps"    Hyoscamine Diarrhea    Levsin [hyoscyamine sulfate]     Macrodantin [nitrofurantoin macrocrystal]     Meperidine Itching and Other (See Comments)     "Erythema and itching at injection site"    Penicillins     Sulfa (sulfonamide antibiotics)     Thimerosal Other (See Comments)     "Positive patch test by history"    Urimar-t [zipspp-laequ-y.blue-sal-naphos]     Vancomycin     Amitriptyline-chlordiazepoxide Rash    Azithromycin Itching and Rash    Ciprofloxacin Itching and Rash    Influenza virus vaccines Palpitations     "Fast heart rate"    Lidocaine Other (See Comments) and Palpitations     "Rapid heart beat"    Povidone-iodine Other (See Comments) and Rash     "Contact dermatitis"    Vibramycin [doxycycline hyclate] Rash      Social History     Tobacco Use    Smoking status: Never    Smokeless tobacco: Never   Substance Use Topics    Alcohol use: Never      Family History   Problem Relation Age of Onset    Colon cancer Mother     Breast cancer Maternal Aunt     Uterine cancer Maternal Aunt     Diabetes type II Maternal Aunt     Hypertension Maternal Grandmother     Hypertension Maternal Grandfather     Hypertension Paternal Grandmother     Hypertension Paternal Grandfather         Review of Systems   Integumentary:  Negative for color change, rash, mole/lesion, breast mass, breast discharge and breast tenderness.   Breast: Negative for mass and tenderness     Physical Exam   HENT:   Head: Normocephalic.   Pulmonary/Chest: Right breast exhibits no inverted nipple, no mass, no nipple discharge, no skin change and no tenderness. Left breast exhibits no inverted nipple, no mass, no nipple discharge, no skin " change and no tenderness. No breast swelling.   Genitourinary: No breast swelling.   Musculoskeletal: Lymphadenopathy:      Upper Body:      Right upper body: No supraclavicular or axillary adenopathy.      Left upper body: No supraclavicular or axillary adenopathy.     Neurological: She is alert.        Assessment/Plan  1. Skin lesion of breast  Assessment & Plan:  She's had no further issues. She understands that her exams have remained stable and is comfortable being followed in a conservative fashion. She understands the importance of monthly self-breast examination and knows to report any and all changes as they occur.               Medical Decision Making:  It is my impression that this patient suffers all conditions contained in this medical document.  Each of these conditions did affect our plan of care and my medical decision making today.  It is my opinion that the medical decision making concerning this patient was of moderate difficulty based on the aforementioned conditions.  Any further recommendations will be communicated to the patient by me.  I have reviewed and verified her allergies, list of medications, medical and surgical histories, social history, and a pertinent review of symptoms.      Follow up:  6 months and prn    For:  [facility] (S) at [facility]

## 2023-04-10 NOTE — ASSESSMENT & PLAN NOTE
She's had no further issues. She understands that her exams have remained stable and is comfortable being followed in a conservative fashion. She understands the importance of monthly self-breast examination and knows to report any and all changes as they occur.

## 2023-10-09 DIAGNOSIS — Z12.31 OTHER SCREENING MAMMOGRAM: Primary | ICD-10-CM

## 2023-10-09 NOTE — PROGRESS NOTES
"  [facility]  MD Esdras Verma, NP-C    Date of Service: 10/17/2023      Chief Complaint:   Christine Panda is a 73 y.o. female presenting today for  6 month follow up. She is due for Mammogram  She reports no interval changes on her self-breast examination.     History of Present Illness:   Mrs. Panda first presented with red tender skin lesion on the left breast that she first noticed in July. She completed one round of Erythromycin two weeks prior to her visit and the area did not improve. I&D was performed and her antibiotics were restarted. MD::: Kobe Prescott MD.    Past Medical History:   Diagnosis Date    Asthma     Atrial fibrillation     Skin lesion of breast 4/9/2023      Past Surgical History:   Procedure Laterality Date    APPENDECTOMY      BILATERAL SALPINGO-OOPHORECTOMY (BSO)      BLADDER SURGERY      ELBOW SURGERY      LAVH       TONSILLECTOMY          Current Outpatient Medications:     apixaban (ELIQUIS) 5 mg Tab, Take 5 mg by mouth 2 (two) times daily., Disp: , Rfl:     fexofenadine (ALLEGRA) 180 MG tablet, Take 180 mg by mouth every evening., Disp: , Rfl:     montelukast (SINGULAIR) 10 mg tablet, Take 10 mg by mouth every evening., Disp: , Rfl:     sotaloL (BETAPACE) 120 MG Tab, Take 60 mg by mouth every 12 (twelve) hours., Disp: , Rfl:    Review of patient's allergies indicates:   Allergen Reactions    Cephalexin Anaphylaxis and Swelling     "Throat closing"    Albuterol sulfate Other (See Comments)     "Headache and shaky"    Amlodipine Other (See Comments)    Amoxicillin-pot clavulanate Hives and Itching     "Itchy hives after 4 days"    Atenolol Other (See Comments)     "Cough and wheeze"    Beta-blockers (beta-adrenergic blocking agts)     Carbacephem     Cephalosporins     Chlorpheniram-pe-chlophedianol     Clindamycin Other (See Comments)     "Severe heartburn"    Flecainide Other (See Comments)    Guaifenesin Other (See " "Comments)     "Stomach cramps"    Hyoscamine Diarrhea    Levsin [hyoscyamine sulfate]     Macrodantin [nitrofurantoin macrocrystal]     Meperidine Itching and Other (See Comments)     "Erythema and itching at injection site"    Penicillins     Sulfa (sulfonamide antibiotics)     Thimerosal Other (See Comments)     "Positive patch test by history"    Urimar-t [[id number].blue-sal-naphos]     Vancomycin     Amitriptyline-chlordiazepoxide Rash    Azithromycin Itching and Rash    Ciprofloxacin Itching and Rash    Influenza virus vaccines Palpitations     "Fast heart rate"    Lidocaine Other (See Comments) and Palpitations     "Rapid heart beat"    Povidone-iodine Other (See Comments) and Rash     "Contact dermatitis"    Vibramycin [doxycycline hyclate] Rash      Social History     Tobacco Use    Smoking status: Never    Smokeless tobacco: Never   Substance Use Topics    Alcohol use: Never      Family History   Problem Relation Age of Onset    Colon cancer Mother     Breast cancer Maternal Aunt     Uterine cancer Maternal Aunt     Diabetes type II Maternal Aunt     Hypertension Maternal Grandmother     Hypertension Maternal Grandfather     Hypertension Paternal Grandmother     Hypertension Paternal Grandfather         Review of Systems   Integumentary:  Negative for color change, rash, mole/lesion, breast mass, breast discharge and breast tenderness.   Breast: Negative for mass and tenderness       Physical Exam   HENT:   Head: Normocephalic.   Pulmonary/Chest: Right breast exhibits no inverted nipple, no mass, no nipple discharge, no skin change and no tenderness. Left breast exhibits no inverted nipple, no mass, no nipple discharge, no skin change and no tenderness. No breast swelling.   Genitourinary: No breast swelling.   Musculoskeletal: Lymphadenopathy:      Upper Body:      Right upper body: No supraclavicular or axillary adenopathy.      Left upper body: No supraclavicular or axillary adenopathy. "     Neurological: She is alert.      Mammogram: Her films are stable based on my review.  As this was done as a screening exam, her films will be reviewed by the Radiologist and compared to her previous films.  Her report will usually be received within 24 hours.  Once received and reviewed - I will phone her with any additional recommendations as needed.      Assessment/Plan  1. Skin lesion of breast  Assessment & Plan:  We reviewed our findings today and her questions were answered.  She understands that her imaging and exams have remained stable (and show nothing concerning).  She is comfortable being followed in a conservative fashion.      She understands the importance of monthly self-breast examination and knows to report any and all changes as they occur.               Medical Decision Making:  It is my impression that this patient suffers all conditions contained in this medical document.  Each of these conditions did affect our plan of care and my medical decision making today.  It is my opinion that the medical decision making concerning this patient was of moderate difficulty based on the aforementioned conditions.  Any further recommendations will be communicated to the patient by me.  I have reviewed and verified her allergies, list of medications, medical and surgical histories, social history, and a pertinent review of symptoms.      Follow up:  1 year and prn    For:  [facility] (S) at [facility]    Addendum 10/2/23 1249: Mammogram- This procedure was performed using tomosynthesis. Computer-aided detection was utilized in the interpretation of this examination.  The breasts have scattered areas of fibroglandular density. There is no evidence of suspicious masses, calcifications, or other abnormal findings.  Bilateral benign breast calcifications.  Impression: Bilateral- There is no mammographic evidence of malignancy.

## 2023-10-17 ENCOUNTER — OFFICE VISIT (OUTPATIENT)
Dept: SURGERY | Facility: CLINIC | Age: 73
End: 2023-10-17
Payer: MEDICARE

## 2023-10-17 DIAGNOSIS — L98.8 SKIN LESION OF BREAST: Primary | ICD-10-CM

## 2023-10-17 PROCEDURE — 4010F PR ACE/ARB THEARPY RXD/TAKEN: ICD-10-PCS | Mod: CPTII,S$GLB,, | Performed by: NURSE PRACTITIONER

## 2023-10-17 PROCEDURE — 1126F AMNT PAIN NOTED NONE PRSNT: CPT | Mod: CPTII,S$GLB,, | Performed by: NURSE PRACTITIONER

## 2023-10-17 PROCEDURE — 1160F PR REVIEW ALL MEDS BY PRESCRIBER/CLIN PHARMACIST DOCUMENTED: ICD-10-PCS | Mod: CPTII,S$GLB,, | Performed by: NURSE PRACTITIONER

## 2023-10-17 PROCEDURE — 1159F PR MEDICATION LIST DOCUMENTED IN MEDICAL RECORD: ICD-10-PCS | Mod: CPTII,S$GLB,, | Performed by: NURSE PRACTITIONER

## 2023-10-17 PROCEDURE — 1160F RVW MEDS BY RX/DR IN RCRD: CPT | Mod: CPTII,S$GLB,, | Performed by: NURSE PRACTITIONER

## 2023-10-17 PROCEDURE — 1159F MED LIST DOCD IN RCRD: CPT | Mod: CPTII,S$GLB,, | Performed by: NURSE PRACTITIONER

## 2023-10-17 PROCEDURE — 4010F ACE/ARB THERAPY RXD/TAKEN: CPT | Mod: CPTII,S$GLB,, | Performed by: NURSE PRACTITIONER

## 2023-10-17 PROCEDURE — 99213 OFFICE O/P EST LOW 20 MIN: CPT | Mod: S$GLB,,, | Performed by: NURSE PRACTITIONER

## 2023-10-17 PROCEDURE — 99999 PR PBB SHADOW E&M-EST. PATIENT-LVL II: ICD-10-PCS | Mod: PBBFAC,,, | Performed by: NURSE PRACTITIONER

## 2023-10-17 PROCEDURE — 1126F PR PAIN SEVERITY QUANTIFIED, NO PAIN PRESENT: ICD-10-PCS | Mod: CPTII,S$GLB,, | Performed by: NURSE PRACTITIONER

## 2023-10-17 PROCEDURE — 99999 PR PBB SHADOW E&M-EST. PATIENT-LVL II: CPT | Mod: PBBFAC,,, | Performed by: NURSE PRACTITIONER

## 2023-10-17 PROCEDURE — 99213 PR OFFICE/OUTPT VISIT, EST, LEVL III, 20-29 MIN: ICD-10-PCS | Mod: S$GLB,,, | Performed by: NURSE PRACTITIONER

## 2023-10-17 RX ORDER — LOSARTAN POTASSIUM 50 MG/1
50 TABLET ORAL
COMMUNITY
Start: 2023-08-13

## 2024-11-06 NOTE — PROGRESS NOTES
"  [facility]  Omega Villeda MD, FACS  LYSSA Arellano      Date of Service: 11/8/2024      Chief Complaint:   Christine Panda is a 74 y.o. female presenting today for her annual evaluation.  She is due for a mammogram. She reports no interval changes.     History of Present Illness:   Mrs. Panda first presented with red tender skin lesion on the left breast that she first noticed in July. She completed one round of Erythromycin two weeks prior to her visit and the area did not improve. I&D was performed and her antibiotics were restarted. MD::: Kobe Prescott MD.     Past Medical History:   Diagnosis Date    Asthma     Atrial fibrillation     Skin lesion of breast 4/9/2023      Past Surgical History:   Procedure Laterality Date    APPENDECTOMY      BILATERAL SALPINGO-OOPHORECTOMY (BSO)      BLADDER SURGERY      ELBOW SURGERY      LAVH       TONSILLECTOMY          Current Outpatient Medications:     apixaban (ELIQUIS) 5 mg Tab, Take 5 mg by mouth 2 (two) times daily., Disp: , Rfl:     fexofenadine (ALLEGRA) 180 MG tablet, Take 180 mg by mouth every evening., Disp: , Rfl:     losartan (COZAAR) 50 MG tablet, Take 50 mg by mouth., Disp: , Rfl:     montelukast (SINGULAIR) 10 mg tablet, Take 10 mg by mouth every evening., Disp: , Rfl:     sotaloL (BETAPACE) 120 MG Tab, Take 60 mg by mouth every 12 (twelve) hours., Disp: , Rfl:    Review of patient's allergies indicates:   Allergen Reactions    Cephalexin Anaphylaxis and Swelling     "Throat closing"    Albuterol sulfate Other (See Comments)     "Headache and shaky"    Amlodipine Other (See Comments)    Amoxicillin-pot clavulanate Hives and Itching     "Itchy hives after 4 days"    Atenolol Other (See Comments)     "Cough and wheeze"    Beta-blockers (beta-adrenergic blocking agts)     Carbacephem     Cephalosporins     Chlorpheniram-pe-chlophedianol     Clindamycin Other (See Comments)     "Severe heartburn"    " "Flecainide Other (See Comments)    Guaifenesin Other (See Comments)     "Stomach cramps"    Hyoscamine Diarrhea    Levsin [hyoscyamine sulfate]     Macrodantin [nitrofurantoin macrocrystal]     Meperidine Itching and Other (See Comments)     "Erythema and itching at injection site"    Penicillins     Sulfa (sulfonamide antibiotics)     Thimerosal Other (See Comments)     "Positive patch test by history"    Urimar-t [[id number]-[id number]-h.blue-sal-naphos]     Vancomycin     Amitriptyline-chlordiazepoxide Rash    Azithromycin Itching and Rash    Ciprofloxacin Itching and Rash    Influenza virus vaccines Palpitations     "Fast heart rate"    Lidocaine Other (See Comments) and Palpitations     "Rapid heart beat"    Povidone-iodine Other (See Comments) and Rash     "Contact dermatitis"    Vibramycin [doxycycline hyclate] Rash      Social History     Tobacco Use    Smoking status: Never    Smokeless tobacco: Never   Substance Use Topics    Alcohol use: Never      Family History   Problem Relation Name Age of Onset    Colon cancer Mother      Breast cancer Maternal Aunt      Uterine cancer Maternal Aunt      Diabetes type II Maternal Aunt      Hypertension Maternal Grandmother      Hypertension Maternal Grandfather      Hypertension Paternal Grandmother      Hypertension Paternal Grandfather          Review of Systems   Integumentary:  Negative for color change, rash, mole/lesion, breast mass, breast discharge and breast tenderness.   Breast: Negative for mass and tenderness       Physical Exam   HENT:   Head: Normocephalic.   Pulmonary/Chest: Right breast exhibits no inverted nipple, no mass, no nipple discharge, no skin change and no tenderness. Left breast exhibits no inverted nipple, no mass, no nipple discharge, no skin change and no tenderness. No breast swelling.   Genitourinary: No breast swelling.   Musculoskeletal: Lymphadenopathy:      Upper Body:      Right upper body: No supraclavicular or axillary adenopathy.      " Left upper body: No supraclavicular or axillary adenopathy.     Neurological: She is alert.        MAMMOGRAM REPORT: 10/25/2024- There are scattered areas of fibroglandular density. There is no evidence of suspicious masses, calcifications, or other abnormal findings.  Bilateral benign breast calcifications. There is no mammographic evidence of malignancy.     ASSESSMENT and PLAN OF CARE     1. Skin lesion of breast  Assessment & Plan:  We reviewed our findings today and her questions were answered.  She understands that her imaging and exams have remained stable (and show nothing concerning).  She is comfortable being followed in a conservative fashion.      She understands the importance of monthly self-breast examination and knows to report any and all changes as they occur.        Medical Decision Making: It is my impression that this patient suffers all conditions contained in this medical document.  Each of these conditions did affect our plan of care and my medical decision making today.  It is my opinion that the medical decision making concerning this patient was of minimal  difficulty based on the aforementioned conditions.  Any further recommendations will be communicated to the patient by me.  I have reviewed and verified her allergies, list of medications, medical and surgical histories, social history, and a pertinent review of symptoms.     Follow up: 1 year and PRN    For: Physical Examination and MGM (S) at [facility]

## 2024-11-08 ENCOUNTER — OFFICE VISIT (OUTPATIENT)
Dept: SURGERY | Facility: CLINIC | Age: 74
End: 2024-11-08
Payer: MEDICARE

## 2024-11-08 DIAGNOSIS — L98.8 SKIN LESION OF BREAST: Primary | ICD-10-CM

## 2024-11-08 PROCEDURE — 99999 PR PBB SHADOW E&M-EST. PATIENT-LVL II: CPT | Mod: PBBFAC,,, | Performed by: NURSE PRACTITIONER

## 2025-03-13 ENCOUNTER — HOSPITAL ENCOUNTER (EMERGENCY)
Facility: HOSPITAL | Age: 75
Discharge: HOME | End: 2025-03-13
Attending: EMERGENCY MEDICINE
Payer: MEDICARE

## 2025-03-13 VITALS
WEIGHT: 156.94 LBS | SYSTOLIC BLOOD PRESSURE: 149 MMHG | DIASTOLIC BLOOD PRESSURE: 89 MMHG | HEART RATE: 86 BPM | BODY MASS INDEX: 21.89 KG/M2 | OXYGEN SATURATION: 99 % | TEMPERATURE: 98 F | RESPIRATION RATE: 18 BRPM

## 2025-03-13 DIAGNOSIS — S60.222A CONTUSION OF LEFT HAND, INITIAL ENCOUNTER: ICD-10-CM

## 2025-03-13 DIAGNOSIS — T14.8XXA ABRASION: Primary | ICD-10-CM

## 2025-03-13 DIAGNOSIS — S00.93XA TRAUMATIC HEMATOMA OF HEAD, INITIAL ENCOUNTER: ICD-10-CM

## 2025-03-13 DIAGNOSIS — S09.93XA FACIAL INJURY, INITIAL ENCOUNTER: ICD-10-CM

## 2025-03-13 DIAGNOSIS — M25.561 ACUTE PAIN OF RIGHT KNEE: ICD-10-CM

## 2025-03-13 DIAGNOSIS — T14.90XA TRAUMA: ICD-10-CM
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
## 2025-03-13 PROCEDURE — 25000003 PHARM REV CODE 250

## 2025-03-13 PROCEDURE — 99284 EMERGENCY DEPT VISIT MOD MDM: CPT | Mod: 25

## 2025-03-13 RX ORDER — MUPIROCIN 20 MG/G
OINTMENT TOPICAL 3 TIMES DAILY
Qty: 15 G | Refills: 0 | Status: SHIPPED | OUTPATIENT
Start: 2025-03-13

## 2025-03-13 RX ORDER — MUPIROCIN 20 MG/G
OINTMENT TOPICAL
Status: COMPLETED | OUTPATIENT
Start: 2025-03-13 | End: 2025-03-13

## 2025-03-13 RX ADMIN — MUPIROCIN: 20 OINTMENT TOPICAL at 01:03

## 2025-03-13 NOTE — ED PROVIDER NOTES
"ED Provider Note - 3/13/2025    History     Chief Complaint   Patient presents with    Fall     Pt. Reports that she tripped and fell this morning. Hematoma above right eye, left hand pain right knee pain. No LOC + blood thinners     Patient with previous medical history of AFib, high blood pressure, asthma.  Patient currently presents with concern regarding head injury.  This occurred just prior to arrival. This was reportedly the result of an incident where the patient tripped over uneven concrete in a walkway.  She denies LOC this was a witnessed fall.   There has not been vomiting.  Patient does not currently complain of HA.  There has not been alteration in consciousness.  Hematoma is noted on the cranium.  She denies dizziness, neck pain, change indentation, visual changes.  There is a superficial laceration above her right eyebrow, superficial abrasions to left hand and right knee with right knee pain on ambulating.  She denies further complaints.          Review of patient's allergies indicates:   Allergen Reactions    Cephalexin Anaphylaxis and Swelling     "Throat closing"    Albuterol sulfate Other (See Comments)     "Headache and shaky"    Amlodipine Other (See Comments)    Amoxicillin-pot clavulanate Hives and Itching     "Itchy hives after 4 days"    Atenolol Other (See Comments)     "Cough and wheeze"    Beta-blockers (beta-adrenergic blocking agts)     Carbacephem     Cephalosporins     Chlorpheniram-pe-chlophedianol     Clindamycin Other (See Comments)     "Severe heartburn"    Flecainide Other (See Comments)    Guaifenesin Other (See Comments)     "Stomach cramps"    Hyoscamine Diarrhea    Levsin [hyoscyamine sulfate]     Macrodantin [nitrofurantoin macrocrystal]     Meperidine Itching and Other (See Comments)     "Erythema and itching at injection site"    Penicillins     Sulfa (sulfonamide antibiotics)     Thimerosal Other (See Comments)     "Positive patch test by history"    Skinnyimar-t " "[xmqiho-khmis-i.blue-sal-naphos]     Vancomycin     Amitriptyline-chlordiazepoxide Rash    Azithromycin Itching and Rash    Ciprofloxacin Itching and Rash    Influenza virus vaccines Palpitations     "Fast heart rate"    Lidocaine Other (See Comments) and Palpitations     "Rapid heart beat"    Povidone-iodine Other (See Comments) and Rash     "Contact dermatitis"    Vibramycin [doxycycline hyclate] Rash     Past Medical History:   Diagnosis Date    Asthma     Atrial fibrillation     Skin lesion of breast 4/9/2023     Past Surgical History:   Procedure Laterality Date    APPENDECTOMY      BILATERAL SALPINGO-OOPHORECTOMY (BSO)      BLADDER SURGERY      ELBOW SURGERY      LAVH       TONSILLECTOMY       Family History   Problem Relation Name Age of Onset    Colon cancer Mother      Breast cancer Maternal Aunt      Uterine cancer Maternal Aunt      Diabetes type II Maternal Aunt      Hypertension Maternal Grandmother      Hypertension Maternal Grandfather      Hypertension Paternal Grandmother      Hypertension Paternal Grandfather       Social History[1]  Review of Systems   Constitutional:  Negative for fever.   HENT:  Negative for sore throat.    Respiratory:  Negative for shortness of breath.    Cardiovascular:  Negative for chest pain.   Gastrointestinal:  Negative for nausea and vomiting.   Genitourinary:  Negative for dysuria.   Musculoskeletal:  Negative for back pain, joint swelling, neck pain and neck stiffness.        Right knee pain, and left hand pain   Skin:  Positive for wound. Negative for rash.        Abrasions to hand, above right eye and right knee   Neurological:  Negative for dizziness, syncope, weakness, numbness and headaches.   Hematological:  Does not bruise/bleed easily.       Physical Exam     Initial Vitals [03/13/25 1130]   BP Pulse Resp Temp SpO2   (!) 183/75 61 18 97.7 °F (36.5 °C) 99 %      MAP       --         Vitals:    03/13/25 1130 03/13/25 1318   BP: (!) 183/75 (!) 149/89   Pulse: 61 " 85   Resp: 18    Temp: 97.7 °F (36.5 °C)    TempSrc: Oral    SpO2: 99% 97%   Weight: 71.2 kg (156 lb 15.5 oz)      Physical Exam    Nursing note reviewed.  Constitutional: She appears well-developed and well-nourished.  Non-toxic appearance. She does not have a sickly appearance. She does not appear ill. No distress.   HENT:   Head: Normocephalic. Head is with contusion. Head is without raccoon's eyes, without Romano's sign, without right periorbital erythema and without left periorbital erythema.       Right Ear: Hearing normal.   Left Ear: Hearing normal.   Nose: Nose normal. No mucosal edema or nasal deformity. No epistaxis. Mouth/Throat: Uvula is midline, oropharynx is clear and moist and mucous membranes are normal. No trismus in the jaw. Normal dentition.   Eyes: Conjunctivae, EOM and lids are normal. Pupils are equal, round, and reactive to light.   Neck: Trachea normal. Neck supple. There are no signs of injury. No crepitus.   Normal range of motion.   Full passive range of motion without pain.     Cardiovascular:  Normal rate, normal heart sounds, intact distal pulses and normal pulses.           Pulmonary/Chest: Breath sounds normal. No respiratory distress.   Abdominal: Abdomen is soft. Bowel sounds are normal. There is no abdominal tenderness. There is no rebound and no guarding.   Musculoskeletal:         General: Normal range of motion.      Right elbow: Normal. Normal range of motion. No tenderness.      Left elbow: Normal. Normal range of motion. No tenderness.      Right wrist: Normal.      Left wrist: No swelling, tenderness or snuff box tenderness. Normal range of motion.      Right hand: Normal.      Left hand: Tenderness present. No deformity. Normal range of motion. Normal strength. Normal sensation. Normal capillary refill. Normal pulse.        Hands:       Cervical back: Normal, full passive range of motion without pain, normal range of motion and neck supple. No edema, signs of trauma,  rigidity, spasms, bony tenderness or crepitus. No pain with movement or spinous process tenderness. Normal range of motion.      Right hip: Normal.      Left hip: Normal.      Right upper leg: Normal.      Left upper leg: Normal.      Right knee: Swelling present. No deformity, effusion or lacerations. Normal range of motion. Tenderness present.      Left knee: Normal.      Right ankle: Normal.      Left ankle: Normal.        Legs:      Neurological: She is alert and oriented to person, place, and time. She has normal strength and normal reflexes. No cranial nerve deficit or sensory deficit. GCS score is 15. GCS eye subscore is 4. GCS verbal subscore is 5. GCS motor subscore is 6.   Skin: Skin is warm and dry. Abrasion noted.   Superfical abrasion noted to patient chin and above patient right eye   Psychiatric: She has a normal mood and affect. Her behavior is normal. Thought content normal.         ED Course   Procedures                   MDM  Differential Diagnoses   Based on available history, the working differential diagnoses considered during this evaluation include but are not limited to sprain/strain, contusion, fracture, dislocation, and scalp contusion/hematoma, concussion, skull fracture, intracranial hemorrhage.      LABS     Labs Reviewed   HEPATITIS C ANTIBODY   HEP C VIRUS HOLD SPECIMEN   HIV 1 / 2 ANTIBODY                Imaging     Imaging Results              CT Head Without Contrast (Final result)  Result time 03/13/25 12:49:18      Final result by Malcom Leigh MD (03/13/25 12:49:18)                   Impression:      1.  Negative for acute intracranial process. Negative for hemorrhage, or skull fracture.    2.  Cerebral atrophy noted.  Intracranial atherosclerotic disease noted.  Small vessel ischemic changes noted.    3.  Right periorbital edema/hematoma noted.  The underlying osseous structures, orbit and globe are intact.    4.  Debris within the right ear canal.    All CT scans at this  facility are performed  using dose modulation techniques as appropriate to performed exam including the following:  automated exposure control; adjustment of mA and/or kV according to the patients size (this includes techniques or standardized protocols for targeted exams where dose is matched to indication/reason for exam: i.e. extremities or head);  iterative reconstruction technique.      Electronically signed by: Malcom Leigh MD  Date:    03/13/2025  Time:    12:49               Narrative:    EXAMINATION:  CT HEAD WITHOUT CONTRAST    CLINICAL HISTORY:  Facial trauma, blunt;    TECHNIQUE:  Axial images through the brain and posterior fossa were obtained without the use of IV contrast.  Sagittal and coronal reconstructions are provided for review.  Motion artifact is present on a number of images.  Subtle abnormalities could be overlooked.    COMPARISON:  No comparison studies are available.    FINDINGS:  The ventricles are midline and the CSF spaces are prominent.  The gray-white matter junction is well preserved. Negative for intracranial vascular abnormalities. Negative for mass, mass effect, cerebral edema, hemorrhage or abnormal fluid collections.  Intracranial atherosclerotic disease noted.  Small vessel ischemic changes noted.    Right periorbital hematoma/edema noted.  The skull and scalp are  intact.  Debris within the right ear canal.  The   paranasal sinuses, mastoid air cells, middle ears and left ear canal are clear. The globes are intact. Postoperative changes to the lens regions.                                       X-Ray Knee Complete 4 Or More Views Right (Final result)  Result time 03/13/25 12:44:23      Final result by Malcom Leigh MD (03/13/25 12:44:23)                   Impression:      1.  Negative for acute process.      Electronically signed by: Malcom Leigh MD  Date:    03/13/2025  Time:    12:44               Narrative:    EXAMINATION:  XR KNEE COMP 4 OR MORE VIEWS  RIGHT    CLINICAL HISTORY:  Injury, unspecified, initial encounter    TECHNIQUE:  AP, lateral, and bilateral oblique views of the right knee were performed.    COMPARISON:  None    FINDINGS:  Negative for knee joint effusion.  Negative for fracture or dislocation.  Joint spaces are well maintained.  Fabella.  Vascular calcifications.  Clothing artifact.                                       X-Ray Hand 3 view Left (Final result)  Result time 03/13/25 12:43:43      Final result by Malcom Leigh MD (03/13/25 12:43:43)                   Impression:      1.  Negative for acute process.    2.  Incidental findings as noted above.      Electronically signed by: Malcom Leigh MD  Date:    03/13/2025  Time:    12:43               Narrative:    EXAMINATION:  XR HAND COMPLETE 3 VIEW LEFT    CLINICAL HISTORY:  trauma;.    TECHNIQUE:  PA, lateral, and oblique views of the left hand were performed.    COMPARISON:  None    FINDINGS:  Osteopenia.  Age-appropriate degenerative changes of the interphalangeal joints.  Mild degenerative changes of the 1st carpometacarpal joint.  Negative for fracture or dislocation.  Negative for soft tissue abnormalities.                                             EKG        ED Management/Discussion     Medications   mupirocin 2 % ointment ( Topical (Top) Given 3/13/25 1308)                 The patient's list of active medical problems, social history, medications, and allergies as documented per RN staff has been reviewed.           The patient's current headache seems to fit the intensity and pattern of hematoma without evidence of intracranial process.  Patient has no evidence of infection nor neurological findings to suggest a more malignant cause for the headache. Patient/family were briefed regarding the extent of the injury as well as the details of the repair.  They have been counseled regarding appropriate wound care and potential signs of infection that should prompt return to the  emergency room for reevaluation and Patient has been instructed regarding the benefit of cold compresses during the immediate period following soft tissue injury along with elevation and relative rest pending symptomatic improvement.    On final assessment, the patient appears suitable for discharge.  I see no indication of an emergent process beyond that addressed during our encounter but have duly counseled the patient/family regarding the need for prompt follow-up as well as the indications that should prompt immediate return to the emergency room.  The patient/family has been provided with language -specific verbal and printed direction regarding our final diagnosis(es) as well as instructions regarding use of OTC and/or Rx medications intended to manage the patient's aforementioned conditions including:  ED Prescriptions       Medication Sig Dispense Start Date End Date Auth. Provider    mupirocin (BACTROBAN) 2 % ointment Apply topically 3 (three) times daily. 15 g 3/13/2025 -- Lizzy Fisher NP              Patient has been advised of the following recommended follow-up instructions:  Follow-up Information       Follow up With Specialties Details Why Contact Info    [facility] - Emergency Dept. Emergency Medicine  If symptoms worsen [address]  [facility] [address] [phone]    Nahomi Tanner MD Internal Medicine  For wound re-check [address]  [address]  [facility] [address] [phone]            The patient/family communicates understanding of all this information and all remaining questions and concerns were addressed at this time.      Referrals:  No orders of the defined types were placed in this encounter.      CLINICAL IMPRESSION    ICD-10-CM ICD-9-CM   1. Abrasion  T14.8XXA 919.0   2. Facial injury, initial encounter  S09.93XA 959.09   3. Trauma  T14.90XA 959.9   4. Fall, initial encounter  W19.XXXA E888.9   5. Contusion of left hand, initial  encounter  S60.222A 923.20   6. Traumatic hematoma of head, initial encounter  S00.93XA 920   7. Acute pain of right knee  M25.561 719.46          ED Disposition Condition    Discharge Stable                   [1]   Social History  Tobacco Use    Smoking status: Never    Smokeless tobacco: Never   Substance Use Topics    Alcohol use: Never    Drug use: Never        Lizzy Fisher, VLAD  03/13/25 [id number]

## 2025-04-15 ENCOUNTER — PATIENT MESSAGE (OUTPATIENT)
Dept: NEUROLOGY | Facility: CLINIC | Age: 75
End: 2025-04-15
Payer: MEDICARE

## 2025-05-17 ENCOUNTER — HOSPITAL ENCOUNTER (EMERGENCY)
Facility: HOSPITAL | Age: 75
Discharge: HOME | End: 2025-05-17
Attending: EMERGENCY MEDICINE
Payer: MEDICARE

## 2025-05-17 VITALS
DIASTOLIC BLOOD PRESSURE: 85 MMHG | RESPIRATION RATE: 15 BRPM | WEIGHT: 157 LBS | OXYGEN SATURATION: 98 % | SYSTOLIC BLOOD PRESSURE: 174 MMHG | BODY MASS INDEX: 21.9 KG/M2 | TEMPERATURE: 98 F | HEART RATE: 60 BPM
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
## 2025-05-17 DIAGNOSIS — M25.522 LEFT ELBOW PAIN: ICD-10-CM

## 2025-05-17 DIAGNOSIS — S09.90XA INJURY OF HEAD, INITIAL ENCOUNTER: ICD-10-CM

## 2025-05-17 DIAGNOSIS — M25.562 LEFT KNEE PAIN: ICD-10-CM

## 2025-05-17 PROCEDURE — 99284 EMERGENCY DEPT VISIT MOD MDM: CPT | Mod: 25

## 2025-05-17 RX ORDER — HYDROCODONE BITARTRATE AND ACETAMINOPHEN 5; 325 MG/1; MG/1
1 TABLET ORAL EVERY 8 HOURS PRN
Qty: 12 TABLET | Refills: 0 | Status: SHIPPED | OUTPATIENT
Start: 2025-05-17

## 2025-05-17 RX ORDER — DOXAZOSIN 1 MG/1
1 TABLET ORAL NIGHTLY
COMMUNITY
Start: 2025-04-15 | End: 2026-04-15

## 2025-05-18 NOTE — ED PROVIDER NOTES
"Encounter Date: 5/17/2025       History     Chief Complaint   Patient presents with    Fall     Fell today at home, tripped over her shoe possibly. Denies dizziness or pain prior to fall. Did not hit head or lose consciousness. Pt takes Eliquis for A-fib. C/o left elbow pain.     74-year-old female presents the emergency department for a trip and fall which occurred prior to arrival.  Patient reports left elbow and left knee pain.  Patient also reports her head struck her left elbow.  Patient reports she is on blood thinners.  Patient denies any loss of consciousness.  Patient denies any fever, chills, chest pain, shortness of breath, back pain, abdominal pain, nausea, vomiting, and all other concerns at this time.        Review of patient's allergies indicates:   Allergen Reactions    Cephalexin Anaphylaxis and Swelling     "Throat closing"    Albuterol sulfate Other (See Comments)     "Headache and shaky"    Amlodipine Other (See Comments)    Amoxicillin-pot clavulanate Hives and Itching     "Itchy hives after 4 days"    Atenolol Other (See Comments)     "Cough and wheeze"    Beta-blockers (beta-adrenergic blocking agts)     Carbacephem     Cephalosporins     Chlorpheniram-pe-chlophedianol     Clindamycin Other (See Comments)     "Severe heartburn"    Flecainide Other (See Comments)    Guaifenesin Other (See Comments)     "Stomach cramps"    Hyoscamine Diarrhea    Levsin [hyoscyamine sulfate]     Macrodantin [nitrofurantoin macrocrystal]     Meperidine Itching and Other (See Comments)     "Erythema and itching at injection site"    Penicillins     Sulfa (sulfonamide antibiotics)     Thimerosal Other (See Comments)     "Positive patch test by history"    Urimar-t [frtntz-pzeml-g.blue-sal-naphos]     Vancomycin     Amitriptyline-chlordiazepoxide Rash    Azithromycin Itching and Rash    Ciprofloxacin Itching and Rash    Influenza virus vaccines Palpitations     "Fast heart rate"    Lidocaine Other (See Comments) and " "Palpitations     "Rapid heart beat"    Povidone-iodine Other (See Comments) and Rash     "Contact dermatitis"    Vibramycin [doxycycline hyclate] Rash     Past Medical History:   Diagnosis Date    Asthma     Atrial fibrillation     Skin lesion of breast 4/9/2023     Past Surgical History:   Procedure Laterality Date    APPENDECTOMY      BILATERAL SALPINGO-OOPHORECTOMY (BSO)      BLADDER SURGERY      ELBOW SURGERY      LAVH       TONSILLECTOMY       Family History   Problem Relation Name Age of Onset    Colon cancer Mother      Breast cancer Maternal Aunt      Uterine cancer Maternal Aunt      Diabetes type II Maternal Aunt      Hypertension Maternal Grandmother      Hypertension Maternal Grandfather      Hypertension Paternal Grandmother      Hypertension Paternal Grandfather       Social History[1]  Review of Systems   Constitutional:  Negative for fever.   HENT:  Negative for sore throat.    Respiratory:  Negative for shortness of breath.    Cardiovascular:  Negative for chest pain.   Gastrointestinal:  Negative for abdominal pain, nausea and vomiting.   Genitourinary:  Negative for dysuria.   Musculoskeletal:  Negative for back pain.   Skin:  Negative for rash.   Neurological:  Negative for weakness.   Hematological:  Does not bruise/bleed easily.       Physical Exam     Initial Vitals [05/17/25 1626]   BP Pulse Resp Temp SpO2   (!) 196/85 (!) 55 18 97.7 °F (36.5 °C) 98 %      MAP       --         Physical Exam    Nursing note and vitals reviewed.  Constitutional: She appears well-developed and well-nourished. She is not diaphoretic. No distress.   HENT:   Head: Normocephalic and atraumatic.   Eyes: Right eye exhibits no discharge. Left eye exhibits no discharge.   Neck: Neck supple.   Normal range of motion.  Cardiovascular:  Normal rate.           Pulmonary/Chest: No respiratory distress.   Abdominal: She exhibits no distension.   Musculoskeletal:         General: Normal range of motion.      Cervical back: " Normal range of motion and neck supple.     Neurological: She is alert and oriented to person, place, and time. She has normal strength.   Skin: Skin is warm and dry.   Psychiatric: She has a normal mood and affect. Her behavior is normal. Thought content normal.         ED Course   Procedures  Labs Reviewed - No data to display       Imaging Results              X-Ray Knee Complete 4 or More Views Left (Final result)  Result time 05/17/25 17:14:02      Final result by Chepe Beckman DO (05/17/25 17:14:02)                   Narrative:    Exam: XR KNEE COMP 4 OR MORE VIEWS LEFT    Comparison: None    Clinical Indication:  Pain    Findings: No acute bone or joint abnormality.  No joint effusion.    Finalized on: 5/17/2025 5:14 PM By:  Chepe Beckman  [facility]# [id number]      2025-05-17 17:16:03.295     [facility]                                     X-Ray Elbow Complete Left (Final result)  Result time 05/17/25 17:07:39      Final result by Ed Evans MD (05/17/25 17:07:39)                   Impression:      No acute findings.    Finalized on: 5/17/2025 5:07 PM By:  Ed Evans MD  [facility]# [id number]      2025-05-17 17:09:42.990     [facility]               Narrative:    EXAM:  XR ELBOW COMPLETE 3 VIEW LEFT.    CLINICAL HISTORY: [M25.522]-Pain in left elbow.    COMPARISON STUDIES: None.    FINDINGS:  Prosthesis at the radial head.  Surgical plate proximal ulna.  Hardware intact.    No acute fractures.    No significant focal osseous lesions.    There are no significant soft tissue findings.                                         CT Head Without Contrast (Final result)  Result time 05/17/25 17:04:37      Final result by Ed Evans MD (05/17/25 17:04:37)                   Impression:      Negative for acute intracranial abnormality.            All CT scans at [this location] are performed using dose modulation techniques as appropriate to a performed exam including the following: automated exposure control; adjustment of  the mA and/or kV according to patient size (this includes techniques or standardized protocols for targeted exams where dose is matched to indication / reason for exam; i.e. extremities or head); use of iterative reconstruction technique.    Finalized on: 5/17/2025 5:04 PM By:  Ed Evans MD  [facility]# [id number]      2025-05-17 17:06:42.824     [facility]               Narrative:    EXAM: CT HEAD WITHOUT CONTRAST    CLINICAL HISTORY: Head trauma, minor (Age >= 65y);    COMPARISON STUDIES: 03/13/2025 head CT    TECHNIQUE:  Axial noncontrast head CT images obtained.    FINDINGS:  Negative for acute hemorrhage, extra-axial collection, or mass effect.    There is good gray-white matter differentiation.    The paranasal sinuses and mastoids appear clear.    The skull is intact, with no fractures evident.                                         Medications - No data to display  Medical Decision Making  Differential diagnosis  Fracture, dislocation, CVA, contusion    Amount and/or Complexity of Data Reviewed  Radiology: ordered.  Discussion of management or test interpretation with external provider(s): I discussed with patient that the evaluation in the emergency department does not suggest any emergent or life threatening medical condition requiring immediate intervention beyond what was provided in the ED, and I believe patient is safe for discharge.  Regardless, an unremarkable evaluation in the ED does not preclude the development or presence of a serious of life threatening condition. As such, patient was instructed to return immediately for any worsening or change in current symptoms. I also discussed the results of my evaluation and diagnosis with patient and she concurs with the evaluation and management plan.  Detailed written and verbal instructions provided to patient and she expressed a verbal understanding of the discharge instructions and overall management plan. Reiterated the importance of medication  administration and safety and advised patient to follow up with primary care provider in 3-5 days or sooner if needed.  Also advised patient to return to the ER for any complications.       Risk  Prescription drug management.                                      Clinical Impression:  Final diagnoses:  [M25.522] Left elbow pain  [M25.562] Left knee pain  [W19.XXXA] Fall, initial encounter (Primary)  [S09.90XA] Injury of head, initial encounter          ED Disposition Condition    Discharge Stable          ED Prescriptions       Medication Sig Dispense Start Date End Date Auth. Provider    HYDROcodone-acetaminophen (NORCO) 5-325 mg per tablet Take 1 tablet by mouth every 8 (eight) hours as needed for Pain. 12 tablet 5/17/2025 -- Rio Bravo NP          Follow-up Information       Follow up With Specialties Details Why Contact Info    pcp of choice   As needed     [facility] - Emergency Dept. Emergency Medicine  If symptoms worsen [address] [facility] [address] [phone]                 [1]   Social History  Tobacco Use    Smoking status: Never    Smokeless tobacco: Never   Substance Use Topics    Alcohol use: Never    Drug use: Never        Rio Bravo NP  05/17/25 7632

## 2025-07-01 ENCOUNTER — PATIENT MESSAGE (OUTPATIENT)
Dept: SURGERY | Facility: CLINIC | Age: 75
End: 2025-07-01
Payer: MEDICARE